# Patient Record
Sex: FEMALE | Race: WHITE | NOT HISPANIC OR LATINO | Employment: OTHER | URBAN - METROPOLITAN AREA
[De-identification: names, ages, dates, MRNs, and addresses within clinical notes are randomized per-mention and may not be internally consistent; named-entity substitution may affect disease eponyms.]

---

## 2017-01-03 ENCOUNTER — HOSPITAL ENCOUNTER (OUTPATIENT)
Dept: MAMMOGRAPHY | Facility: HOSPITAL | Age: 62
Discharge: HOME | End: 2017-01-03
Attending: FAMILY MEDICINE

## 2017-01-03 DIAGNOSIS — Z12.31 VISIT FOR SCREENING MAMMOGRAM: ICD-10-CM

## 2017-01-09 ENCOUNTER — OFFICE VISIT - HEALTHEAST (OUTPATIENT)
Dept: FAMILY MEDICINE | Facility: CLINIC | Age: 62
End: 2017-01-09

## 2017-01-09 DIAGNOSIS — Z00.00 ROUTINE GENERAL MEDICAL EXAMINATION AT A HEALTH CARE FACILITY: ICD-10-CM

## 2017-01-09 LAB
CHOLEST SERPL-MCNC: 215 MG/DL
FASTING STATUS PATIENT QL REPORTED: YES
HDLC SERPL-MCNC: 54 MG/DL
LDLC SERPL CALC-MCNC: 132 MG/DL
TRIGL SERPL-MCNC: 143 MG/DL

## 2017-01-09 ASSESSMENT — MIFFLIN-ST. JEOR: SCORE: 1253.49

## 2017-03-13 ENCOUNTER — COMMUNICATION - HEALTHEAST (OUTPATIENT)
Dept: SCHEDULING | Facility: CLINIC | Age: 62
End: 2017-03-13

## 2017-03-27 ENCOUNTER — COMMUNICATION - HEALTHEAST (OUTPATIENT)
Dept: FAMILY MEDICINE | Facility: CLINIC | Age: 62
End: 2017-03-27

## 2017-03-27 ENCOUNTER — RECORDS - HEALTHEAST (OUTPATIENT)
Dept: GENERAL RADIOLOGY | Facility: CLINIC | Age: 62
End: 2017-03-27

## 2017-03-27 ENCOUNTER — OFFICE VISIT - HEALTHEAST (OUTPATIENT)
Dept: FAMILY MEDICINE | Facility: CLINIC | Age: 62
End: 2017-03-27

## 2017-03-27 DIAGNOSIS — M25.519 SHOULDER PAIN: ICD-10-CM

## 2017-03-27 DIAGNOSIS — M25.519 PAIN IN UNSPECIFIED SHOULDER: ICD-10-CM

## 2017-03-27 ASSESSMENT — MIFFLIN-ST. JEOR: SCORE: 1244.42

## 2017-05-09 ENCOUNTER — COMMUNICATION - HEALTHEAST (OUTPATIENT)
Dept: FAMILY MEDICINE | Facility: CLINIC | Age: 62
End: 2017-05-09

## 2017-05-09 DIAGNOSIS — M25.519 SHOULDER PAIN: ICD-10-CM

## 2017-05-12 ENCOUNTER — RECORDS - HEALTHEAST (OUTPATIENT)
Dept: ADMINISTRATIVE | Facility: OTHER | Age: 62
End: 2017-05-12

## 2017-06-14 ENCOUNTER — RECORDS - HEALTHEAST (OUTPATIENT)
Dept: ADMINISTRATIVE | Facility: OTHER | Age: 62
End: 2017-06-14

## 2017-06-19 ENCOUNTER — RECORDS - HEALTHEAST (OUTPATIENT)
Dept: ADMINISTRATIVE | Facility: OTHER | Age: 62
End: 2017-06-19

## 2017-06-23 ENCOUNTER — RECORDS - HEALTHEAST (OUTPATIENT)
Dept: ADMINISTRATIVE | Facility: OTHER | Age: 62
End: 2017-06-23

## 2017-06-26 ENCOUNTER — COMMUNICATION - HEALTHEAST (OUTPATIENT)
Dept: FAMILY MEDICINE | Facility: CLINIC | Age: 62
End: 2017-06-26

## 2017-06-26 DIAGNOSIS — E04.1 THYROID NODULE: ICD-10-CM

## 2017-06-27 ENCOUNTER — HOSPITAL ENCOUNTER (OUTPATIENT)
Dept: ULTRASOUND IMAGING | Facility: HOSPITAL | Age: 62
Discharge: HOME | End: 2017-06-27
Attending: FAMILY MEDICINE

## 2017-06-27 ENCOUNTER — COMMUNICATION - HEALTHEAST (OUTPATIENT)
Dept: FAMILY MEDICINE | Facility: CLINIC | Age: 62
End: 2017-06-27

## 2017-06-27 DIAGNOSIS — E04.1 THYROID NODULE: ICD-10-CM

## 2017-06-28 ENCOUNTER — OFFICE VISIT - HEALTHEAST (OUTPATIENT)
Dept: FAMILY MEDICINE | Facility: CLINIC | Age: 62
End: 2017-06-28

## 2017-06-28 DIAGNOSIS — E04.1 THYROID NODULE: ICD-10-CM

## 2017-06-28 ASSESSMENT — MIFFLIN-ST. JEOR: SCORE: 1250.94

## 2017-06-29 ENCOUNTER — HOSPITAL ENCOUNTER (OUTPATIENT)
Dept: ULTRASOUND IMAGING | Facility: CLINIC | Age: 62
Discharge: HOME | End: 2017-06-29
Attending: FAMILY MEDICINE

## 2017-06-29 DIAGNOSIS — E04.1 THYROID NODULE: ICD-10-CM

## 2017-07-03 ENCOUNTER — COMMUNICATION - HEALTHEAST (OUTPATIENT)
Dept: FAMILY MEDICINE | Facility: CLINIC | Age: 62
End: 2017-07-03

## 2017-07-03 DIAGNOSIS — E04.1 THYROID NODULE: ICD-10-CM

## 2017-07-06 ENCOUNTER — RECORDS - HEALTHEAST (OUTPATIENT)
Dept: ADMINISTRATIVE | Facility: OTHER | Age: 62
End: 2017-07-06

## 2017-07-07 ENCOUNTER — COMMUNICATION - HEALTHEAST (OUTPATIENT)
Dept: FAMILY MEDICINE | Facility: CLINIC | Age: 62
End: 2017-07-07

## 2017-07-11 ENCOUNTER — COMMUNICATION - HEALTHEAST (OUTPATIENT)
Dept: FAMILY MEDICINE | Facility: CLINIC | Age: 62
End: 2017-07-11

## 2017-07-19 LAB
LAB AP CHARGES (HE HISTORICAL CONVERSION): ABNORMAL
LAB AP INITIAL CYTO EVAL (HE HISTORICAL CONVERSION): ABNORMAL
LAB MED GENERAL PATH INTERP (HE HISTORICAL CONVERSION): ABNORMAL
PATH REPORT.ADDENDUM SPEC: ABNORMAL
PATH REPORT.COMMENTS IMP SPEC: ABNORMAL
PATH REPORT.COMMENTS IMP SPEC: ABNORMAL
PATH REPORT.FINAL DX SPEC: ABNORMAL
PATH REPORT.MICROSCOPIC SPEC OTHER STN: ABNORMAL
PATH REPORT.RELEVANT HX SPEC: ABNORMAL
SPECIMEN DESCRIPTION: ABNORMAL

## 2017-07-25 ENCOUNTER — OFFICE VISIT - HEALTHEAST (OUTPATIENT)
Dept: SURGERY | Facility: CLINIC | Age: 62
End: 2017-07-25

## 2017-07-25 DIAGNOSIS — E04.1 THYROID NODULE: ICD-10-CM

## 2017-07-25 ASSESSMENT — MIFFLIN-ST. JEOR: SCORE: 1260.3

## 2017-07-27 ENCOUNTER — AMBULATORY - HEALTHEAST (OUTPATIENT)
Dept: SURGERY | Facility: CLINIC | Age: 62
End: 2017-07-27

## 2017-07-28 ENCOUNTER — RECORDS - HEALTHEAST (OUTPATIENT)
Dept: ADMINISTRATIVE | Facility: OTHER | Age: 62
End: 2017-07-28

## 2017-08-02 ENCOUNTER — OFFICE VISIT - HEALTHEAST (OUTPATIENT)
Dept: FAMILY MEDICINE | Facility: CLINIC | Age: 62
End: 2017-08-02

## 2017-08-02 DIAGNOSIS — Z01.818 PREOPERATIVE EXAMINATION: ICD-10-CM

## 2017-08-02 LAB
ATRIAL RATE - MUSE: 63 BPM
DIASTOLIC BLOOD PRESSURE - MUSE: NORMAL MMHG
INTERPRETATION ECG - MUSE: NORMAL
P AXIS - MUSE: 17 DEGREES
PR INTERVAL - MUSE: 158 MS
QRS DURATION - MUSE: 70 MS
QT - MUSE: 402 MS
QTC - MUSE: 411 MS
R AXIS - MUSE: -1 DEGREES
SYSTOLIC BLOOD PRESSURE - MUSE: NORMAL MMHG
T AXIS - MUSE: 0 DEGREES
VENTRICULAR RATE- MUSE: 63 BPM

## 2017-08-02 ASSESSMENT — MIFFLIN-ST. JEOR: SCORE: 1258.94

## 2017-08-14 ENCOUNTER — AMBULATORY - HEALTHEAST (OUTPATIENT)
Dept: SURGERY | Facility: CLINIC | Age: 62
End: 2017-08-14

## 2017-08-14 ENCOUNTER — COMMUNICATION - HEALTHEAST (OUTPATIENT)
Dept: FAMILY MEDICINE | Facility: CLINIC | Age: 62
End: 2017-08-14

## 2017-08-18 ENCOUNTER — RECORDS - HEALTHEAST (OUTPATIENT)
Dept: ADMINISTRATIVE | Facility: OTHER | Age: 62
End: 2017-08-18

## 2017-10-22 ENCOUNTER — COMMUNICATION - HEALTHEAST (OUTPATIENT)
Dept: FAMILY MEDICINE | Facility: CLINIC | Age: 62
End: 2017-10-22

## 2017-10-26 ENCOUNTER — AMBULATORY - HEALTHEAST (OUTPATIENT)
Dept: NURSING | Facility: CLINIC | Age: 62
End: 2017-10-26

## 2017-11-29 ENCOUNTER — OFFICE VISIT - HEALTHEAST (OUTPATIENT)
Dept: FAMILY MEDICINE | Facility: CLINIC | Age: 62
End: 2017-11-29

## 2017-11-29 DIAGNOSIS — B00.1 RECURRENT COLD SORES: ICD-10-CM

## 2017-11-29 DIAGNOSIS — Z01.818 ENCOUNTER FOR PREOPERATIVE EXAMINATION FOR GENERAL SURGICAL PROCEDURE: ICD-10-CM

## 2017-11-29 DIAGNOSIS — Z01.818 PREOPERATIVE CLEARANCE: ICD-10-CM

## 2017-11-29 ASSESSMENT — MIFFLIN-ST. JEOR: SCORE: 1273.06

## 2017-12-08 ENCOUNTER — HOSPITAL ENCOUNTER (OUTPATIENT)
Dept: ULTRASOUND IMAGING | Facility: HOSPITAL | Age: 62
Discharge: HOME | End: 2017-12-08
Attending: SURGERY

## 2017-12-08 DIAGNOSIS — D38.0: ICD-10-CM

## 2017-12-13 LAB
LAB AP CHARGES (HE HISTORICAL CONVERSION): NORMAL
LAB AP INITIAL CYTO EVAL (HE HISTORICAL CONVERSION): NORMAL
LAB MED GENERAL PATH INTERP (HE HISTORICAL CONVERSION): NORMAL
PATH REPORT.COMMENTS IMP SPEC: NORMAL
PATH REPORT.COMMENTS IMP SPEC: NORMAL
PATH REPORT.FINAL DX SPEC: NORMAL
PATH REPORT.MICROSCOPIC SPEC OTHER STN: NORMAL
PATH REPORT.RELEVANT HX SPEC: NORMAL
SPECIMEN DESCRIPTION: NORMAL

## 2017-12-27 ENCOUNTER — COMMUNICATION - HEALTHEAST (OUTPATIENT)
Dept: FAMILY MEDICINE | Facility: CLINIC | Age: 62
End: 2017-12-27

## 2017-12-29 ENCOUNTER — RECORDS - HEALTHEAST (OUTPATIENT)
Dept: ADMINISTRATIVE | Facility: OTHER | Age: 62
End: 2017-12-29

## 2018-01-12 ENCOUNTER — HOSPITAL ENCOUNTER (OUTPATIENT)
Dept: MAMMOGRAPHY | Facility: HOSPITAL | Age: 63
Discharge: HOME | End: 2018-01-12
Attending: FAMILY MEDICINE

## 2018-01-12 DIAGNOSIS — Z12.31 VISIT FOR SCREENING MAMMOGRAM: ICD-10-CM

## 2018-06-13 ENCOUNTER — AMBULATORY - HEALTHEAST (OUTPATIENT)
Dept: SCHEDULING | Facility: CLINIC | Age: 63
End: 2018-06-13

## 2018-06-13 ENCOUNTER — OFFICE VISIT - HEALTHEAST (OUTPATIENT)
Dept: FAMILY MEDICINE | Facility: CLINIC | Age: 63
End: 2018-06-13

## 2018-06-13 DIAGNOSIS — Z82.62 FAMILY HX OSTEOPOROSIS: ICD-10-CM

## 2018-06-13 DIAGNOSIS — E04.1 THYROID NODULE: ICD-10-CM

## 2018-06-13 DIAGNOSIS — Z00.00 HEALTHCARE MAINTENANCE: ICD-10-CM

## 2018-06-13 DIAGNOSIS — M19.019 SHOULDER ARTHRITIS: ICD-10-CM

## 2018-06-13 LAB
CHOLEST SERPL-MCNC: 203 MG/DL
FASTING STATUS PATIENT QL REPORTED: YES
FASTING STATUS PATIENT QL REPORTED: YES
GLUCOSE BLD-MCNC: 95 MG/DL (ref 70–99)
HDLC SERPL-MCNC: 54 MG/DL
HGB BLD-MCNC: 13.1 G/DL (ref 12–16)
LDLC SERPL CALC-MCNC: 125 MG/DL
TRIGL SERPL-MCNC: 119 MG/DL
TSH SERPL DL<=0.005 MIU/L-ACNC: 1.33 UIU/ML (ref 0.3–5)

## 2018-06-13 ASSESSMENT — MIFFLIN-ST. JEOR: SCORE: 1258.02

## 2018-06-15 ENCOUNTER — HOSPITAL ENCOUNTER (OUTPATIENT)
Dept: ULTRASOUND IMAGING | Facility: HOSPITAL | Age: 63
Discharge: HOME | End: 2018-06-15
Attending: FAMILY MEDICINE

## 2018-06-15 DIAGNOSIS — E04.1 THYROID NODULE: ICD-10-CM

## 2018-06-22 ENCOUNTER — RECORDS - HEALTHEAST (OUTPATIENT)
Dept: ADMINISTRATIVE | Facility: OTHER | Age: 63
End: 2018-06-22

## 2018-07-13 ENCOUNTER — RECORDS - HEALTHEAST (OUTPATIENT)
Dept: ADMINISTRATIVE | Facility: OTHER | Age: 63
End: 2018-07-13

## 2018-09-14 ENCOUNTER — RECORDS - HEALTHEAST (OUTPATIENT)
Dept: ADMINISTRATIVE | Facility: OTHER | Age: 63
End: 2018-09-14

## 2018-10-24 ENCOUNTER — AMBULATORY - HEALTHEAST (OUTPATIENT)
Dept: NURSING | Facility: CLINIC | Age: 63
End: 2018-10-24

## 2018-10-26 ENCOUNTER — RECORDS - HEALTHEAST (OUTPATIENT)
Dept: ADMINISTRATIVE | Facility: OTHER | Age: 63
End: 2018-10-26

## 2018-11-30 ENCOUNTER — RECORDS - HEALTHEAST (OUTPATIENT)
Dept: ADMINISTRATIVE | Facility: OTHER | Age: 63
End: 2018-11-30

## 2018-12-07 ENCOUNTER — RECORDS - HEALTHEAST (OUTPATIENT)
Dept: ADMINISTRATIVE | Facility: OTHER | Age: 63
End: 2018-12-07

## 2019-01-03 ENCOUNTER — COMMUNICATION - HEALTHEAST (OUTPATIENT)
Dept: FAMILY MEDICINE | Facility: CLINIC | Age: 64
End: 2019-01-03

## 2019-01-04 ENCOUNTER — AMBULATORY - HEALTHEAST (OUTPATIENT)
Dept: NURSING | Facility: CLINIC | Age: 64
End: 2019-01-04

## 2019-02-12 ENCOUNTER — COMMUNICATION - HEALTHEAST (OUTPATIENT)
Dept: FAMILY MEDICINE | Facility: CLINIC | Age: 64
End: 2019-02-12

## 2019-02-12 DIAGNOSIS — B00.1 RECURRENT COLD SORES: ICD-10-CM

## 2019-03-27 ENCOUNTER — OFFICE VISIT - HEALTHEAST (OUTPATIENT)
Dept: FAMILY MEDICINE | Facility: CLINIC | Age: 64
End: 2019-03-27

## 2019-03-27 DIAGNOSIS — R05.9 COUGH: ICD-10-CM

## 2019-03-27 LAB
ALBUMIN SERPL-MCNC: 4 G/DL (ref 3.5–5)
ALP SERPL-CCNC: 74 U/L (ref 45–120)
ALT SERPL W P-5'-P-CCNC: 15 U/L (ref 0–45)
ANION GAP SERPL CALCULATED.3IONS-SCNC: 8 MMOL/L (ref 5–18)
AST SERPL W P-5'-P-CCNC: 20 U/L (ref 0–40)
BASOPHILS # BLD AUTO: 0.1 THOU/UL (ref 0–0.2)
BASOPHILS NFR BLD AUTO: 1 % (ref 0–2)
BILIRUB SERPL-MCNC: 0.3 MG/DL (ref 0–1)
BUN SERPL-MCNC: 13 MG/DL (ref 8–22)
CALCIUM SERPL-MCNC: 9.6 MG/DL (ref 8.5–10.5)
CHLORIDE BLD-SCNC: 105 MMOL/L (ref 98–107)
CO2 SERPL-SCNC: 26 MMOL/L (ref 22–31)
CREAT SERPL-MCNC: 0.87 MG/DL (ref 0.6–1.1)
EOSINOPHIL # BLD AUTO: 0.2 THOU/UL (ref 0–0.4)
EOSINOPHIL NFR BLD AUTO: 2 % (ref 0–6)
ERYTHROCYTE [DISTWIDTH] IN BLOOD BY AUTOMATED COUNT: 12.5 % (ref 11–14.5)
GFR SERPL CREATININE-BSD FRML MDRD: >60 ML/MIN/1.73M2
GLUCOSE BLD-MCNC: 89 MG/DL (ref 70–125)
HCT VFR BLD AUTO: 40 % (ref 35–47)
HGB BLD-MCNC: 13.4 G/DL (ref 12–16)
LYMPHOCYTES # BLD AUTO: 2.7 THOU/UL (ref 0.8–4.4)
LYMPHOCYTES NFR BLD AUTO: 40 % (ref 20–40)
MCH RBC QN AUTO: 30 PG (ref 27–34)
MCHC RBC AUTO-ENTMCNC: 33.5 G/DL (ref 32–36)
MCV RBC AUTO: 90 FL (ref 80–100)
MONOCYTES # BLD AUTO: 0.4 THOU/UL (ref 0–0.9)
MONOCYTES NFR BLD AUTO: 7 % (ref 2–10)
NEUTROPHILS # BLD AUTO: 3.3 THOU/UL (ref 2–7.7)
NEUTROPHILS NFR BLD AUTO: 50 % (ref 50–70)
PLATELET # BLD AUTO: 219 THOU/UL (ref 140–440)
PMV BLD AUTO: 7.9 FL (ref 7–10)
POTASSIUM BLD-SCNC: 4.6 MMOL/L (ref 3.5–5)
PROT SERPL-MCNC: 7.1 G/DL (ref 6–8)
RBC # BLD AUTO: 4.47 MILL/UL (ref 3.8–5.4)
SODIUM SERPL-SCNC: 139 MMOL/L (ref 136–145)
WBC: 6.6 THOU/UL (ref 4–11)

## 2019-08-20 ENCOUNTER — COMMUNICATION - HEALTHEAST (OUTPATIENT)
Dept: FAMILY MEDICINE | Facility: CLINIC | Age: 64
End: 2019-08-20

## 2019-10-01 ENCOUNTER — OFFICE VISIT - HEALTHEAST (OUTPATIENT)
Dept: FAMILY MEDICINE | Facility: CLINIC | Age: 64
End: 2019-10-01

## 2019-10-01 DIAGNOSIS — Z12.31 VISIT FOR SCREENING MAMMOGRAM: ICD-10-CM

## 2019-10-01 DIAGNOSIS — Z13.220 SCREENING FOR LIPOID DISORDERS: ICD-10-CM

## 2019-10-01 DIAGNOSIS — Z13.1 SCREENING FOR DIABETES MELLITUS: ICD-10-CM

## 2019-10-01 DIAGNOSIS — Z00.00 ROUTINE GENERAL MEDICAL EXAMINATION AT A HEALTH CARE FACILITY: ICD-10-CM

## 2019-10-01 DIAGNOSIS — E04.1 THYROID NODULE: ICD-10-CM

## 2019-10-01 LAB
CHOLEST SERPL-MCNC: 199 MG/DL
FASTING STATUS PATIENT QL REPORTED: YES
FASTING STATUS PATIENT QL REPORTED: YES
GLUCOSE BLD-MCNC: 85 MG/DL (ref 70–99)
HDLC SERPL-MCNC: 55 MG/DL
LDLC SERPL CALC-MCNC: 113 MG/DL
TRIGL SERPL-MCNC: 154 MG/DL
TSH SERPL DL<=0.005 MIU/L-ACNC: 0.76 UIU/ML (ref 0.3–5)

## 2019-10-01 ASSESSMENT — MIFFLIN-ST. JEOR: SCORE: 1255.19

## 2019-10-02 LAB
HPV SOURCE: NORMAL
HUMAN PAPILLOMA VIRUS 16 DNA: NEGATIVE
HUMAN PAPILLOMA VIRUS 18 DNA: NEGATIVE
HUMAN PAPILLOMA VIRUS FINAL DIAGNOSIS: NORMAL
HUMAN PAPILLOMA VIRUS OTHER HR: NEGATIVE
SPECIMEN DESCRIPTION: NORMAL

## 2019-10-08 LAB
BKR LAB AP ABNORMAL BLEEDING: NO
BKR LAB AP BIRTH CONTROL/HORMONES: NORMAL
BKR LAB AP CERVICAL APPEARANCE: NORMAL
BKR LAB AP GYN ADEQUACY: NORMAL
BKR LAB AP GYN INTERPRETATION: NORMAL
BKR LAB AP HPV REFLEX: NORMAL
BKR LAB AP LMP: 2009
BKR LAB AP PATIENT STATUS: NORMAL
BKR LAB AP PREVIOUS ABNORMAL: NORMAL
BKR LAB AP PREVIOUS NORMAL: 2014
HIGH RISK?: NO
PATH REPORT.COMMENTS IMP SPEC: NORMAL
RESULT FLAG (HE HISTORICAL CONVERSION): NORMAL

## 2019-12-02 ENCOUNTER — HOSPITAL ENCOUNTER (OUTPATIENT)
Dept: ULTRASOUND IMAGING | Facility: HOSPITAL | Age: 64
Discharge: HOME | End: 2019-12-02
Attending: FAMILY MEDICINE

## 2019-12-02 DIAGNOSIS — E04.1 THYROID NODULE: ICD-10-CM

## 2020-02-06 ENCOUNTER — COMMUNICATION - HEALTHEAST (OUTPATIENT)
Dept: FAMILY MEDICINE | Facility: CLINIC | Age: 65
End: 2020-02-06

## 2020-02-06 DIAGNOSIS — Z12.11 SPECIAL SCREENING FOR MALIGNANT NEOPLASMS, COLON: ICD-10-CM

## 2020-02-06 DIAGNOSIS — Z12.31 VISIT FOR SCREENING MAMMOGRAM: ICD-10-CM

## 2020-02-14 ENCOUNTER — HOSPITAL ENCOUNTER (OUTPATIENT)
Dept: MAMMOGRAPHY | Facility: CLINIC | Age: 65
Discharge: HOME | End: 2020-02-14
Attending: FAMILY MEDICINE

## 2020-02-14 DIAGNOSIS — Z12.31 VISIT FOR SCREENING MAMMOGRAM: ICD-10-CM

## 2020-02-18 ENCOUNTER — COMMUNICATION - HEALTHEAST (OUTPATIENT)
Dept: SCHEDULING | Facility: CLINIC | Age: 65
End: 2020-02-18

## 2020-02-18 ENCOUNTER — OFFICE VISIT - HEALTHEAST (OUTPATIENT)
Dept: FAMILY MEDICINE | Facility: CLINIC | Age: 65
End: 2020-02-18

## 2020-02-18 DIAGNOSIS — J06.9 VIRAL URI: ICD-10-CM

## 2020-02-18 LAB — DEPRECATED S PYO AG THROAT QL EIA: NORMAL

## 2020-02-18 ASSESSMENT — MIFFLIN-ST. JEOR: SCORE: 1252.92

## 2020-02-19 LAB — GROUP A STREP BY PCR: NORMAL

## 2020-05-15 ENCOUNTER — COMMUNICATION - HEALTHEAST (OUTPATIENT)
Dept: FAMILY MEDICINE | Facility: CLINIC | Age: 65
End: 2020-05-15

## 2020-05-15 DIAGNOSIS — B00.1 RECURRENT COLD SORES: ICD-10-CM

## 2020-05-16 ENCOUNTER — COMMUNICATION - HEALTHEAST (OUTPATIENT)
Dept: FAMILY MEDICINE | Facility: CLINIC | Age: 65
End: 2020-05-16

## 2020-05-16 DIAGNOSIS — B00.1 RECURRENT COLD SORES: ICD-10-CM

## 2020-05-18 RX ORDER — VALACYCLOVIR HYDROCHLORIDE 1 G/1
TABLET, FILM COATED ORAL
Qty: 20 TABLET | Refills: 11 | Status: SHIPPED | OUTPATIENT
Start: 2020-05-18 | End: 2021-08-06

## 2020-06-01 ENCOUNTER — RECORDS - HEALTHEAST (OUTPATIENT)
Dept: ADMINISTRATIVE | Facility: OTHER | Age: 65
End: 2020-06-01

## 2020-07-07 ENCOUNTER — RECORDS - HEALTHEAST (OUTPATIENT)
Dept: ADMINISTRATIVE | Facility: OTHER | Age: 65
End: 2020-07-07

## 2020-10-05 ENCOUNTER — COMMUNICATION - HEALTHEAST (OUTPATIENT)
Dept: SCHEDULING | Facility: CLINIC | Age: 65
End: 2020-10-05

## 2020-10-05 ENCOUNTER — OFFICE VISIT - HEALTHEAST (OUTPATIENT)
Dept: FAMILY MEDICINE | Facility: CLINIC | Age: 65
End: 2020-10-05

## 2020-10-05 DIAGNOSIS — R05.3 CHRONIC COUGH: ICD-10-CM

## 2020-11-04 ENCOUNTER — AMBULATORY - HEALTHEAST (OUTPATIENT)
Dept: SCHEDULING | Facility: CLINIC | Age: 65
End: 2020-11-04

## 2020-11-04 ENCOUNTER — HOSPITAL ENCOUNTER (OUTPATIENT)
Dept: ULTRASOUND IMAGING | Facility: HOSPITAL | Age: 65
Discharge: HOME | End: 2020-11-04
Attending: FAMILY MEDICINE

## 2020-11-04 ENCOUNTER — OFFICE VISIT - HEALTHEAST (OUTPATIENT)
Dept: FAMILY MEDICINE | Facility: CLINIC | Age: 65
End: 2020-11-04

## 2020-11-04 DIAGNOSIS — R93.89 ABNORMAL ULTRASOUND OF THYROID GLAND: ICD-10-CM

## 2020-11-04 DIAGNOSIS — Z13.220 ENCOUNTER FOR SCREENING FOR LIPOID DISORDERS: ICD-10-CM

## 2020-11-04 DIAGNOSIS — Z78.0 POSTMENOPAUSAL: ICD-10-CM

## 2020-11-04 DIAGNOSIS — Z13.1 ENCOUNTER FOR SCREENING FOR DIABETES MELLITUS: ICD-10-CM

## 2020-11-04 DIAGNOSIS — Z00.00 ROUTINE GENERAL MEDICAL EXAMINATION AT A HEALTH CARE FACILITY: ICD-10-CM

## 2020-11-04 DIAGNOSIS — Z23 IMMUNIZATION DUE: ICD-10-CM

## 2020-11-04 DIAGNOSIS — Z23 INFLUENZA VACCINE NEEDED: ICD-10-CM

## 2020-11-04 LAB
CHOLEST SERPL-MCNC: 196 MG/DL
FASTING STATUS PATIENT QL REPORTED: YES
FASTING STATUS PATIENT QL REPORTED: YES
GLUCOSE BLD-MCNC: 98 MG/DL (ref 70–99)
HDLC SERPL-MCNC: 54 MG/DL
LDLC SERPL CALC-MCNC: 121 MG/DL
TRIGL SERPL-MCNC: 104 MG/DL

## 2020-11-04 ASSESSMENT — MIFFLIN-ST. JEOR: SCORE: 1266.06

## 2020-11-06 ENCOUNTER — COMMUNICATION - HEALTHEAST (OUTPATIENT)
Dept: FAMILY MEDICINE | Facility: CLINIC | Age: 65
End: 2020-11-06

## 2020-11-06 DIAGNOSIS — J01.10 ACUTE NON-RECURRENT FRONTAL SINUSITIS: ICD-10-CM

## 2021-01-13 ENCOUNTER — RECORDS - HEALTHEAST (OUTPATIENT)
Dept: ADMINISTRATIVE | Facility: OTHER | Age: 66
End: 2021-01-13

## 2021-04-19 ENCOUNTER — HOSPITAL ENCOUNTER (OUTPATIENT)
Dept: MAMMOGRAPHY | Facility: CLINIC | Age: 66
Discharge: HOME | End: 2021-04-19
Attending: FAMILY MEDICINE

## 2021-04-19 DIAGNOSIS — Z12.31 VISIT FOR SCREENING MAMMOGRAM: ICD-10-CM

## 2021-05-18 ENCOUNTER — AMBULATORY - HEALTHEAST (OUTPATIENT)
Dept: CARDIOLOGY | Facility: CLINIC | Age: 66
End: 2021-05-18

## 2021-05-18 DIAGNOSIS — Z00.6 EXAMINATION OF PARTICIPANT OR CONTROL IN CLINICAL RESEARCH: ICD-10-CM

## 2021-05-18 ASSESSMENT — MIFFLIN-ST. JEOR: SCORE: 1286.15

## 2021-05-23 ENCOUNTER — HEALTH MAINTENANCE LETTER (OUTPATIENT)
Age: 66
End: 2021-05-23

## 2021-05-25 ENCOUNTER — RECORDS - HEALTHEAST (OUTPATIENT)
Dept: ADMINISTRATIVE | Facility: CLINIC | Age: 66
End: 2021-05-25

## 2021-05-27 VITALS
HEIGHT: 62 IN | TEMPERATURE: 98.2 F | HEART RATE: 69 BPM | SYSTOLIC BLOOD PRESSURE: 117 MMHG | OXYGEN SATURATION: 95 % | WEIGHT: 173.7 LBS | RESPIRATION RATE: 13 BRPM | BODY MASS INDEX: 31.96 KG/M2 | DIASTOLIC BLOOD PRESSURE: 64 MMHG

## 2021-05-27 NOTE — PROGRESS NOTES
Chief Complaint   Patient presents with     Cough     x 3-4 months - no other symptoms         HPI:   Yana Valdez is a 63 y.o. female cough for several months.  Gets better and gets worse.  Was productive for a time.  No fever.  No shortness of breath.  No chest pain.  No head congestion--last time 2-3 weeks ago.  No medication taken.  No trouble with heartburn.  Does .  No night sweats.  No hemoptysis.    No history of tobacco.  No h/o asthma.  No h/o allergies.    ROS:  A 10 point comprehensive review of systems was negative except as noted.     Medications:  Current Outpatient Medications on File Prior to Visit   Medication Sig Dispense Refill     valACYclovir (VALTREX) 1000 MG tablet TAKE 2 TABLETS BY MOUTH AT THE ONSET OF THE COLD SORE AND 1 TABLET 4 HOURS LATER. CAN REPEAT IF NEEDED FOR 3 DAYS 20 tablet 0     No current facility-administered medications on file prior to visit.          Social History:  Social History     Tobacco Use     Smoking status: Never Smoker     Smokeless tobacco: Never Used   Substance Use Topics     Alcohol use: Yes     Comment: occasionally         Physical Exam:   Vitals:    03/27/19 1300   BP: 124/70   Pulse: 60   Temp: 98.7  F (37.1  C)   TempSrc: Oral   SpO2: 98%   Weight: 173 lb (78.5 kg)       GENERAL:   Alert. Oriented.  EYES: Clear  HENT:  Ears: R TM pearly gray. Normal landmarks. L TM pearly gray.  Normal landmarks  Nose: Clear.  Sinuses: Nontender.  Oropharynx:  No erythema. No exudate.  NECK: Supple. No adenopathy.  LUNGS: Clear to ascultation.  No crackles.  No wheezing  HEART: RRR  SKIN:  No rash.     LABS:  Results for orders placed or performed in visit on 03/27/19   HM1 (CBC with Diff)   Result Value Ref Range    WBC 6.6 4.0 - 11.0 thou/uL    RBC 4.47 3.80 - 5.40 mill/uL    Hemoglobin 13.4 12.0 - 16.0 g/dL    Hematocrit 40.0 35.0 - 47.0 %    MCV 90 80 - 100 fL    MCH 30.0 27.0 - 34.0 pg    MCHC 33.5 32.0 - 36.0 g/dL    RDW 12.5 11.0 - 14.5 %     Platelets 219 140 - 440 thou/uL    MPV 7.9 7.0 - 10.0 fL    Neutrophils % 50 50 - 70 %    Lymphocytes % 40 20 - 40 %    Monocytes % 7 2 - 10 %    Eosinophils % 2 0 - 6 %    Basophils % 1 0 - 2 %    Neutrophils Absolute 3.3 2.0 - 7.7 thou/uL    Lymphocytes Absolute 2.7 0.8 - 4.4 thou/uL    Monocytes Absolute 0.4 0.0 - 0.9 thou/uL    Eosinophils Absolute 0.2 0.0 - 0.4 thou/uL    Basophils Absolute 0.1 0.0 - 0.2 thou/uL      XRAY:  CXR PA & LAT:   No cardiomegaly, infiltrate or effusion.  Normal chest. Personally reviewed film.      Assessment/Plan:    1. Cough  HM1(CBC and Differential)    Comprehensive Metabolic Panel    XR Chest 2 Views    HM1 (CBC with Diff)    doxycycline (MONODOX) 100 MG capsule    fluticasone propionate (FLONASE) 50 mcg/actuation nasal spray      Prolonged cough.  Discussed may be series of URI, post nasal drainage, less likely Reflux.  Course of doxy.  Start steroid nasal spray.  Recheck if worsening or no improvement in 4 weeks.          Stacey Marks MD      3/27/2019    The following portions of the patient's history were reviewed and updated as appropriate: allergies, current medications, past family history, past medical history, past social history, past surgical history and problem list.

## 2021-05-30 VITALS — HEIGHT: 61 IN | WEIGHT: 168 LBS | BODY MASS INDEX: 31.72 KG/M2

## 2021-05-30 VITALS — BODY MASS INDEX: 32.1 KG/M2 | WEIGHT: 170 LBS | HEIGHT: 61 IN

## 2021-05-31 ENCOUNTER — RECORDS - HEALTHEAST (OUTPATIENT)
Dept: ADMINISTRATIVE | Facility: CLINIC | Age: 66
End: 2021-05-31

## 2021-05-31 VITALS — WEIGHT: 169.44 LBS | HEIGHT: 61 IN | BODY MASS INDEX: 31.99 KG/M2

## 2021-05-31 VITALS — BODY MASS INDEX: 32.32 KG/M2 | HEIGHT: 61 IN | WEIGHT: 171.2 LBS

## 2021-05-31 VITALS — WEIGHT: 174.31 LBS | BODY MASS INDEX: 32.91 KG/M2 | HEIGHT: 61 IN

## 2021-05-31 VITALS — WEIGHT: 171.5 LBS | BODY MASS INDEX: 32.38 KG/M2 | HEIGHT: 61 IN

## 2021-05-31 NOTE — TELEPHONE ENCOUNTER
Name of form/paperwork: Other:  [facility] Physician Report  Have you been seen for this request: Yes:  3/27/19 Last physical was 6/13/18  Do we have the form: Yes- Sent via My Chart  When is form needed by: 8/30/19  How would you like the form returned:   Fax Number: N/A  Patient Notified form requests are processed in 3-5 business days: Yes  (If patient needs form sooner, please note that in this message.)  Okay to leave a detailed message? Yes, [phone]

## 2021-05-31 NOTE — TELEPHONE ENCOUNTER
Spoke to pt's , relayed that no forms were received via [employer].  , Ja, states he will be faxing forms to [phone].  Will wait for fax.

## 2021-05-31 NOTE — TELEPHONE ENCOUNTER
Spoke to pt's , relayed that no forms were received via [employer].  , Ja, states he will be faxing forms to [phone].  Will wait for fax.

## 2021-05-31 NOTE — TELEPHONE ENCOUNTER
Who is calling:  Patient's  Ja  Reason for Call:  Patient needs a form filled out for Day Care re-certification. Reviewed patient's medications and Ja relayed patient is not taking either anymore. Valacyclovir is only for cold sore outbreaks but not listed as PRN. Patient is not taking Flonase  Date of last appointment with primary care: 3/27/19  Okay to leave a detailed message: Yes

## 2021-05-31 NOTE — TELEPHONE ENCOUNTER
"Pt's  notified forms are complete.  Copy emailed and also sent via mail to home address on file.  Message sent to pt to schedule a physical and is due for tdap in October.      Copy in the \"jic\" and sent to scan for chart.  "

## 2021-06-01 VITALS — WEIGHT: 169.25 LBS | HEIGHT: 62 IN | BODY MASS INDEX: 31.15 KG/M2
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
## 2021-06-02 VITALS — WEIGHT: 173 LBS | BODY MASS INDEX: 32.16 KG/M2

## 2021-06-03 VITALS
TEMPERATURE: 97.7 F | BODY MASS INDEX: 32 KG/M2 | HEIGHT: 61 IN | DIASTOLIC BLOOD PRESSURE: 78 MMHG | SYSTOLIC BLOOD PRESSURE: 112 MMHG | WEIGHT: 169.5 LBS | HEART RATE: 68 BPM | RESPIRATION RATE: 12 BRPM

## 2021-06-04 ENCOUNTER — RECORDS - HEALTHEAST (OUTPATIENT)
Dept: ADMINISTRATIVE | Facility: OTHER | Age: 66
End: 2021-06-04

## 2021-06-04 VITALS
HEIGHT: 61 IN | BODY MASS INDEX: 31.91 KG/M2 | TEMPERATURE: 98.6 F | DIASTOLIC BLOOD PRESSURE: 82 MMHG | SYSTOLIC BLOOD PRESSURE: 133 MMHG | OXYGEN SATURATION: 100 % | WEIGHT: 169 LBS | HEART RATE: 65 BPM | RESPIRATION RATE: 16 BRPM

## 2021-06-05 VITALS
HEART RATE: 54 BPM | BODY MASS INDEX: 32.47 KG/M2 | DIASTOLIC BLOOD PRESSURE: 83 MMHG | HEIGHT: 61 IN | TEMPERATURE: 98.3 F | SYSTOLIC BLOOD PRESSURE: 136 MMHG | RESPIRATION RATE: 12 BRPM | WEIGHT: 172 LBS

## 2021-06-05 NOTE — TELEPHONE ENCOUNTER
Please note this is two requests. Patient states the referral need to be done for payment .    #1  Referral Request  Type of referral: colonsocopy  Who s requesting: Patient  Why the request: due she states per provider   Have you been seen for this request: Yes  Does patient have a preference on a group/provider? [facility]  Okay to leave a detailed message?  Yes [phone]       #2  Referral Request  Type of referral: mammogram  Who s requesting: Patient  Why the request: Mother and sister  of breast cancer.  It is recommended she have a mammogram every year.   Have you been seen for this request: Yes  Does patient have a preference on a group/provider? [facility]   Okay to leave a detailed message?  Yes [phone]

## 2021-06-06 NOTE — TELEPHONE ENCOUNTER
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  Would like a Strep test.  Declines appt ; leaving on a trip.    RN Action/Disposition:  Protocol recommends see Dr in 24 hrs/ lab test within 24 hrs..  Offered [facility]  Call back if worse symptoms  Discussed home care measures.  Agrees to plan.     Ann Denise, BRAXTON    [facility] Triage/med refill  2/18/2020  3:46 PM        Reason for Disposition    [1] Adult is leaving on a trip AND [2] requests an antibiotic NOW    Protocols used: SORE THROAT-A-AH

## 2021-06-06 NOTE — PROGRESS NOTES
Assessment & Plan:       1. Viral URI  Rapid Strep A Screen-Throat    Group A Strep, RNA Direct Detection, Throat     Medical Decision Making  Patient presents with 1 day of sore throat after exposure to strep pharyngitis.  Her rapid strep is negative at this time with no significant signs of strep throat on physical exam.  The patient may be developing early signs of a viral upper respiratory infection.  Recommended conservative management of fluids, rest, over-the-counter analgesics, and honey.  Discussed signs of worsening symptoms and when to follow-up with PCP if no symptom improvement.    Patient Instructions   Rapid Strep test was negative.     If overnight test returns positive, we will call tomorrow and call in antibiotic prescription.    No notification means that overnight test returned negative.    Symptoms are likely due to viral infection that will resolve on its own in 1-2 weeks.    May continue with symptomatic treatments including:  - Salt water gargles  - Warm beverages such as a non-caffeinated tea with honey  - Throat drops  - Ibuprofen or acetaminophen for pain or fever relief    If fevers not coming down with acetaminophen or ibuprofen, shortness of breath, not tolerating oral liquid intake, drooling, or stiff neck, return for re-evaluation immediately. Otherwise, if no improvement in the next week, follow up with your primary care provider.          Subjective:       Yana Valdez is a 64 y.o. female here for evaluation of recent exposure to strep throat.  Associated symptoms include feeling some swelling along the patient's lymph nodes in the neck as well as mild sore throat.  She otherwise denies cough, fevers, shortness of breath, and body aches.  Patient did get a flu shot this year.  She does not take any medicines to help with her symptoms.    The following portions of the patient's history were reviewed and updated as appropriate: allergies, current medications and problem  "list.    Review of Systems  Pertinent items are noted in HPI.     Allergies  No Known Allergies    Family History   Problem Relation Age of Onset     Breast cancer Mother 43     Dementia Mother         lewy body     Hypertension Father      Diabetes Father      Breast cancer Sister 43     Thyroid nodules Sister      Diabetes Brother      Thyroid nodules Brother      Diabetes Brother      Thyroid nodules Sister        Social History     Socioeconomic History     Marital status:      Spouse name: None     Number of children: None     Years of education: None     Highest education level: None   Occupational History     None   Social Needs     Financial resource strain: None     Food insecurity:     Worry: None     Inability: None     Transportation needs:     Medical: None     Non-medical: None   Tobacco Use     Smoking status: Never Smoker     Smokeless tobacco: Never Used   Substance and Sexual Activity     Alcohol use: Yes     Comment: occasionally     Drug use: No     Sexual activity: None   Lifestyle     Physical activity:     Days per week: None     Minutes per session: None     Stress: None   Relationships     Social connections:     Talks on phone: None     Gets together: None     Attends Jehovah's witness service: None     Active member of club or organization: None     Attends meetings of clubs or organizations: None     Relationship status: None     Intimate partner violence:     Fear of current or ex partner: None     Emotionally abused: None     Physically abused: None     Forced sexual activity: None   Other Topics Concern     None   Social History Narrative     None         Objective:       /82 (Patient Site: Right Arm, Patient Position: Sitting, Cuff Size: Adult Regular)   Pulse 65   Temp 98.6  F (37  C) (Oral)   Resp 16   Ht 5' 1.25\" (1.556 m)   Wt 169 lb (76.7 kg)   LMP 12/30/2008   SpO2 100%   BMI 31.67 kg/m    General appearance: alert, appears stated age, cooperative, no distress and " non-toxic  Throat: Mild tonsillar swelling with erythema, no exudate, mucous membranes moist  Neck: mild anterior cervical adenopathy and supple, symmetrical, trachea midline     Lab Results    Recent Results (from the past 24 hour(s))   Rapid Strep A Screen-Throat   Result Value Ref Range    Rapid Strep A Antigen No Group A Strep detected, presumptive negative No Group A Strep detected, presumptive negative     I personally reviewed these results and discussed findings with the patient.

## 2021-06-08 NOTE — PROGRESS NOTES
Assessment/Plan:     Health maintenance female exam.  All questions answered.  PAP UTD  Breast self exam technique reviewed and patient encouraged to perform self-exam monthly.  Discussed healthy lifestyle modifications.  Mammogram UTD.  Await fasting lab results  The following high BMI interventions were performed this visit: encouragement to exercise and weight monitoring    1. Routine general medical examination at a health care facility  - Influenza, Seasonal,Quad Inj, 36+ MOS  - Lipid Cascade FASTING  - Glucose  - Hemoglobin  - Thyroid Cascade          Subjective:      Yana Valdez is a 61 y.o. female who presents for an annual exam.  She is overall doing very well.  She presents for her routine examination.    She really has no questions today.  3 of her siblings have thyroid nodules and 2 of them are on thyroid medication.  She wonders if she can have her thyroid levels checked today.      Healthy Habits:   Regular Exercise: Yes  Sunscreen Use: Yes  Healthy Diet: Yes  Dental Visits Regularly: Yes  Seat Belt: Yes  Sexually active: Yes  Self Breast Exam Monthly:Yes  Colonoscopy: Yes  Prevention of Osteoporosis: Yes  Last Dexa: N/A    Immunization History   Administered Date(s) Administered     Influenza, seasonal,quad inj 36+ mos 2015, 2017     Influenza, seasonal,quad inj 6-35 mos 2014     Td, historic 2000, 2000, 10/12/2009     Tdap 10/12/2009     ZOSTER 2015     Immunization status: up to date and documented.    Gynecologic History  Patient's last menstrual period was 2008.  Contraception: post menopausal status  Last Pap: . Results were: normal  Last mammogram: . Results were: normal      OB History    Para Term  AB SAB TAB Ectopic Multiple Living   2 2 2             # Outcome Date GA Lbr Tonny/2nd Weight Sex Delivery Anes PTL Lv   2 Term            1 Term                   No current outpatient prescriptions on file.     No current  facility-administered medications for this visit.      Past Medical History   Diagnosis Date     Knee osteoarthritis      Past Surgical History   Procedure Laterality Date     Pr knee scope,diagnostic       Description: Arthroscopy Knee Left;  Proc Date: 10/12/1999;  Comments: ACL tear- not repaired; menisicus repaired     Pr lap,cholecystectomy       Description: Cholecystectomy Laparoscopic;  Proc Date: 10/12/1992;     Pr removal gallbladder       Description: Cholecystectomy;  Recorded: 11/06/2014;     Pr repair cruciate ligament,knee       Description: Primary Repair Of Knee Ligament Cruciate Anterior;  Recorded: 11/06/2014;     Hand surgery       Review of patient's allergies indicates no known allergies.  Family History   Problem Relation Age of Onset     Breast cancer Mother 43     Dementia Mother      lewy body     Hypertension Father      Diabetes Father      Breast cancer Sister 43     Thyroid nodules Sister      Diabetes Brother      Thyroid nodules Brother      Thyroid nodules Brother      Social History     Social History     Marital status:      Spouse name: N/A     Number of children: N/A     Years of education: N/A     Occupational History     Not on file.     Social History Main Topics     Smoking status: Never Smoker     Smokeless tobacco: Not on file     Alcohol use Not on file     Drug use: Not on file     Sexual activity: Not on file     Other Topics Concern     Not on file     Social History Narrative       Review of Systems  General:  Denies problems  Eyes:  Denies problems  Ears/Nose/Throat:  Denies problems  Cardiovascular:  Denies problems  Respiratory:  Denies problems  Gastrointestinal:  Denies problems  Genitourinary:  Denies problems  Musculoskeletal:  Denies problems  Skin:  Denies problems, Neurologic:  Denies problems  Psychiatric:  Denies problems  Endocrine:  Denies problems  Heme/Lymphatic:  Denies problems  Allergic/Immunologic:  Denies problems       Objective:        "  Vitals:    01/09/17 0920   BP: 100/78   Pulse: 64   Resp: 12   Temp: 98.3  F (36.8  C)   TempSrc: Oral   Weight: 170 lb (77.1 kg)   Height: 5' 1\" (1.549 m)       Physical Exam:  General Appearance: Alert, cooperative, no distress, appears stated age   Head: Normocephalic, without obvious abnormality, atraumatic  Eyes: PERRL, conjunctiva/corneas clear, EOM's intact   Ears: Normal TM's and external ear canals, both ears  Nose:Nares normal, septum midline,mucosa normal, no drainage    Throat:Lips, mucosa, and tongue normal; teeth and gums normal  Neck: Supple, symmetrical, trachea midline, no adenopathy;  thyroid: not enlarged, symmetric, no tenderness/mass/nodules  Back: Symmetric, no curvature, ROM normal,  Lungs: Clear to auscultation bilaterally, respirations unlabored  Breasts: No breast masses, tenderness, asymmetry, or nipple discharge.  Heart: Regular rate and rhythm, S1 and S2 normal, no murmur, rub, or gallop  Abdomen: Soft, non-tender, bowel sounds active all four quadrants,  no masses, no organomegaly  Extremities: Extremities normal, atraumatic, no cyanosis or edema  Skin: Skin color, texture, turgor normal, no rashes or lesions  Lymph nodes: Cervical, supraclavicular, and axillary nodes normal and   Neurologic: Normal       "

## 2021-06-08 NOTE — TELEPHONE ENCOUNTER
RN cannot approve Refill Request    RN can NOT refill this medication Protocol failed and NO refill given.      Lien Arboleda, [facility] Triage/Med Refill 5/18/2020    Requested Prescriptions   Pending Prescriptions Disp Refills     valACYclovir (VALTREX) 1000 MG tablet 20 tablet 11     Sig: TAKE 2 TABLETS BY MOUTH AT THE ONSET OF THE COLD SORE AND 1 TABLET 4 HOURS LATER. CAN REPEAT IF NEEDED FOR 3 DAYS       Antivirals Refill Protocol Failed - 5/15/2020  4:46 PM        Failed - Renal function done in last year     Creatinine   Date Value Ref Range Status   03/27/2019 0.87 0.60 - 1.10 mg/dL Final             Passed - Visit with PCP or prescribing provider visit in past 12 months or next 3 months     Last office visit with prescriber/PCP: 6/28/2017 Ema Hart MD OR same dept: Visit date not found OR same specialty: 3/27/2019 Stacey Marks MD  Last physical: 10/1/2019 Last MTM visit: Visit date not found   Next visit within 3 mo: Visit date not found  Next physical within 3 mo: Visit date not found  Prescriber OR PCP: Ema Hart MD  Last diagnosis associated with med order: 1. Recurrent cold sores  - valACYclovir (VALTREX) 1000 MG tablet; TAKE 2 TABLETS BY MOUTH AT THE ONSET OF THE COLD SORE AND 1 TABLET 4 HOURS LATER. CAN REPEAT IF NEEDED FOR 3 DAYS  Dispense: 20 tablet; Refill: 0    If protocol passes may refill for 12 months if within 3 months of last provider visit (or a total of 15 months).

## 2021-06-09 NOTE — PROGRESS NOTES
Assessment/Plan:    Yana Valdez is a 61 y.o. female presenting for:    1. Shoulder pain  I have personally reviewed the x-ray of the shoulder provided to be unremarkable for any fracture or dislocation.  It is unclear what is exactly causing the patient's pain.  Potentially she is having some impingement.  I have asked her to pay somewhat closer attention to when the pain occurs and what activities she is doing during that time..  She will keep me updated.  Otherwise I did give her some physical therapy exercises.  If this continues to happen or becomes worse I would have her see the orthopedic physicians.  - XR Shoulder Right 2 or More VWS; Future        There are no discontinued medications.        Chief Complaint:  Chief Complaint   Patient presents with     Neck Pain     R sided neck, shoulder, arm pain     Cough       Subjective:   Yana Valdez is a very pleasant 61-year-old female presenting to the clinic today with concerns for right-sided shoulder pain.  The patient states that over the last several months she has had several episodes of right-sided fairly intense shoulder pain.  She states that she will be going about her daily business when she has an acute onset of pain starting she believes in her right armpit radiating in her shoulder and down her arm.  When this happens her arm is so painful that she has to hold it close to her body.  She is unsure if she has any numbness.  She does not feel like something has moved out of position.  The pain will slowly josey over a few hours.  She cannot think of any factors that exacerbate the issue.  When she is having pain she does have some significant tenderness in her right anterior shoulder.  She states that she cannot sleep on that shoulder due to discomfort.    There was no trauma.  No chest pain or shortness of breath or lightheadedness is associated.    12 point review of systems completed and negative except for what has been described  "above    History   Smoking Status     Never Smoker   Smokeless Tobacco     Not on file       No current outpatient prescriptions on file.         Objective:  Vitals:    03/27/17 1432   BP: 110/72   Pulse: 64   Resp: 12   Temp: 98.3  F (36.8  C)   TempSrc: Oral   Weight: 168 lb (76.2 kg)   Height: 5' 1\" (1.549 m)       Vital signs reviewed and stable  General: No acute distress  Psych: Appropriate affect  HEENT: moist mucous membranes  Cardiovascular: regular rate and rhythm with no murmur  Pulmonary: clear to auscultation bilaterally with no wheeze  Abdomen: soft, non tender, non distended with normo-active bowel sounds  Extremities: warm and well perfused with no edema  Skin: warm and dry with no rash  Musculoskeletal: Right shoulder at this point has overall normal range of motion.  She has slightly decreased range of motion when she is trying to reach behind her.  Minimal tenderness to palpation but she does have some tenderness in the anterior shoulder region.       This note has been dictated and transcribed using voice recognition software.   Any errors in transcription are unintentional and inherent to the software.  "

## 2021-06-11 NOTE — PROGRESS NOTES
Assessment/Plan:    Yana Valdez is a 61 y.o. female presenting for:    1. Thyroid nodule  Because she had 2 highly suspicious thyroid nodules we will order an ultrasound thyroid biopsy.  I discussed this procedure with the patient as well as the risks and benefits.  We discussed the importance of the procedure in determining the consistency of the nodules.  We discussed that her ultrasound results are very concerning for thyroid cancer but that the majority of thyroid cancers are not metastatic but would likely require surgery.  - HM1(CBC and Differential)  - Thyroid Cascade  - Basic Metabolic Panel  - HM1 (CBC with Diff)  - US Thyroid Biopsy; Future    15 minutes spent today discussing this diagnosis with the patient over half of which spent in face-to-face counseling and coordination of care    There are no discontinued medications.        Chief Complaint:  Chief Complaint   Patient presents with     Follow-up     Thyroid U/S Results       Subjective:   Yana Valdez is a pleasant 61-year-old female presenting to the clinic today for follow-up of thyroid nodules.  The patient had an MRI of her shoulder for shoulder pain and incidentally a thyroid nodule was partially visualized.  Follow-up was recommended and it was recommended that she return to her primary for follow-up.  I had ordered an ultrasound and asked her to schedule follow-up appointment to review the results.  The results showed 3 thyroid nodules 2 in the highly suspicious category and one in the moderately suspicious category.    1.  Right thyroid 3.7 cm TI-RADS 5 nodule.  2.  Right thyroid 2.6 cm TI-RADS 4 nodule.  3.  Left thyroid 1.4 cm TI-RADS 5 nodule.    I have asked her to come in for follow-up to discuss these nodules.  The patient has a family history of thyroid disease but she does not feel as though there is any thyroid cancer in the family.    12 point review of systems completed and negative except for what has been described  "above    History   Smoking Status     Never Smoker   Smokeless Tobacco     Not on file       No current outpatient prescriptions on file.         Objective:  Vitals:    06/28/17 1408   BP: 110/80   Pulse: 64   Resp: 12   Temp: 99.2  F (37.3  C)   TempSrc: Oral   Weight: 169 lb 7 oz (76.9 kg)   Height: 5' 1\" (1.549 m)       Vital signs reviewed and stable  General: No acute distress  Psych: Appropriate affect  Further examination was not done due to the nature of this visit today         This note has been dictated and transcribed using voice recognition software.   Any errors in transcription are unintentional and inherent to the software.  "

## 2021-06-12 NOTE — TELEPHONE ENCOUNTER
Medication Request  Medication name: Antibiotics for the cough  [facility]ed [facility]  Reason for request: The patient states she is still coughing and TannerEma MD noted she could try an antibiotic. The patient is not sure what she wants to do as for she doesn't like the side effects. The patient would like a phone call.   When did you use medication last?:  NA  Patient offered appointment:  N/A - electronic request  Okay to leave a detailed message: yes

## 2021-06-12 NOTE — TELEPHONE ENCOUNTER
Left detailed message for patient that Dr. Hart has sent Augmentin to [facility] and has placed ENT referral. ENT phone numbers left for patient to call and schedule.

## 2021-06-12 NOTE — TELEPHONE ENCOUNTER
Patient Returning Call  Reason for call:  Would like to try antibiotic and also referral for ENT clinic  Information relayed to patient:  See previous phone message  Patient has additional questions:  No  If YES, what are your questions/concerns: N/A  Okay to leave a detailed message?: Yes  Would like to be called when prescription has been filled and referral placed.

## 2021-06-12 NOTE — TELEPHONE ENCOUNTER
I will send and antibiotic and will place the referral - can you get her numbers?    [facility]: [phone]  [facility]: [phone]  [place] [facility]: [phone]      Thanks    BB

## 2021-06-12 NOTE — TELEPHONE ENCOUNTER
Called and left message with patient.  Could trial antibiotic, reflux medication or even referral to ENT.  Encouraged her to either call back and let me know which she would like to do or to send a MyChart message.    MASSIEL

## 2021-06-12 NOTE — PROGRESS NOTES
Assessment/Plan:     Problem List Items Addressed This Visit     None      Visit Diagnoses     Chronic cough    -  Primary    Relevant Orders    XR Chest 2 Views (Completed)        The patient has a cough which is been present for about a month.  It sounds more like allergies and I suggested empirically treating with Zyrtec 10 mg daily for the next 2 to 4 weeks.  She is comfortable with that plan will let me know if her symptoms do not resolve.  Consider empirically treating for a sinusitis with an antibiotic as a next step.  Patient's chest x-ray today was negative.    Subjective:       65 y.o. female presents for evaluation of a cough.  The patient's daughter encouraged her to be seen today.  The patient states that her symptoms started about a month ago with a slight dry cough.  She denies any other symptoms when this first happened such as fever or sore throat.  The cough is been about the same since that time.  In the past few days she has started developing some postnasal drainage.  However, she denies sinus pain or pressure and does not have any nasal discharge.  She denies any history of asthma and never smoked cigarettes.  She does not have any associated wheezing or chest pain.  She also denies any shortness of breath.      Reviewed: The following portions of the patient's history were reviewed and updated as appropriate: allergies, current medications, past family history, past medical history, past social history, past surgical history and problem list.    Review of Systems  Pertinent items are noted in HPI.        Objective:     [facility] 12/30/2008   General appearance: alert, appears stated age and cooperative  Lungs: clear to auscultation bilaterally  Heart: regular rate and rhythm, S1, S2 normal, no murmur, click, rub or gallop      This note has been dictated using voice recognition software. Any grammatical or context distortions are unintentional and inherent to the software

## 2021-06-12 NOTE — PROGRESS NOTES
"HPI: I am consulted by Ema Hart MD in this 61 y.o. female regarding a thyroid nodule. She has had  an indeterminate FNA. She has noted this for about 6 weeks.  She was having trouble with shoulder pain, and had an MRI at which time the thyroid nodules were detected.  No difficulty swallowing, breathing and voice changes.  She has not had  weight gain, weight loss, diarrhea, palpitations, tremulousness, sweating and hair or skin changes.    No Known Allergies    No current outpatient prescriptions on file.    Past Medical History:   Diagnosis Date     Knee osteoarthritis        Past Surgical History:   Procedure Laterality Date     CHOLECYSTECTOMY       HAND SURGERY       LA KNEE SCOPE,DIAGNOSTIC      Description: Arthroscopy Knee Left;  Proc Date: 10/12/1999;  Comments: ACL tear- not repaired; menisicus repaired     LA LAP,CHOLECYSTECTOMY      Description: Cholecystectomy Laparoscopic;  Proc Date: 10/12/1992;     LA REMOVAL GALLBLADDER      Description: Cholecystectomy;  Recorded: 11/06/2014;     LA REPAIR CRUCIATE LIGAMENT,KNEE      Description: Primary Repair Of Knee Ligament Cruciate Anterior;  Recorded: 11/06/2014;       Social History     Social History     Marital status:      Spouse name: N/A     Number of children: N/A     Years of education: N/A     Occupational History     Not on file.     Social History Main Topics     Smoking status: Never Smoker     Smokeless tobacco: Never Used     Alcohol use Yes      Comment: occasionally     Drug use: No     Sexual activity: Not on file     Other Topics Concern     Not on file     Social History Narrative       Review of Systems - Negative except as noted in the HPI    /79 (Patient Site: Right Arm, Patient Position: Sitting, Cuff Size: Adult Regular)  Pulse 63  Ht 5' 1\" (1.549 m)  Wt 171 lb 8 oz (77.8 kg)  LMP 12/30/2008  SpO2 98%  Breastfeeding? No  BMI 32.4 kg/m2  Body mass index is 32.4 kg/(m^2).    EXAM:  GENERAL: This is a well " developed female in no distress.  SKIN: Grossly intact  EYES: No icterus  CARDIAC: RRR w/out murmur  CHEST/LUNG: Clear  THYROID:  3 cm nodule right lobe; 1 cm nodule left lobe  NECK: No cervical adenopathy.   NEURO: Alert and oriented  AFFECT: Pleasant    LABS:     Ref Range & Units 6/28/17  2:29 PM     TSH 0.30 - 5.00 uIU/mL 0.71       IMAGES:   Us Thyroid    Result Date: 6/27/2017   US THYROID 6/27/2017 2:28 PM INDICATION: nodule TECHNIQUE: Routine. COMPARISON: None. FINDINGS: RIGHT thyroid lobe measures 5.6 x 3 x 2.8 cm. Heterogeneous nodular echotexture. Mid pole 2.6 x 2.6 x 1.9 cm nodule. Adjacent lower pole 3.7 x 3.4 x 2.5 cm nodule. Nodule: Mid pole 2.6 cm. Composition: 1 Echogenicity: 2 Shape: 0 Margin: 0 Echogenic Foci: 3 Point Total: 6 Nodule: Lower pole 3.7 cm. Composition: 2 Echogenicity: 2 Shape: 0 Margin: 0 Echogenic Foci: 3 Point Total: 7 LEFT thyroid lobe measures 3.7 x 1.9 x 1.4 cm. Mildly heterogeneous echotexture with a mid pole 1.4 x 1.1 x 0.9 cm nodule. Nodule: Mid pole 1.4 cm Composition: 2 Echogenicity: 2 Shape: 0 Margin: 0 Echogenic Foci: 3 Point Total: 7  Isthmus measures 4 mm. Isthmic 6 mm hypoechoic nodule. No cervical lymphadenopathy.     CONCLUSION: 1.  Right thyroid 3.7 cm TI-RADS 5 nodule. 2.  Right thyroid 2.6 cm TI-RADS 4 nodule. 3.  Left thyroid 1.4 cm TI-RADS 5 nodule. 4.  Recommend FNA of the right thyroid 3.7 and left thyroid 1.4 cm nodules. TI-RADS 1. Benign. No FNA. TI-RADS 2. Not suspicious. No FNA. TI-RADS 3. Mildly suspicious. FNA if >=2.5 cm. Follow up ultrasound in 1 year >=1.5 cm. TI-RADS 4. Moderately suspicious. FNA if >=1.5cm. Follow up ultrasound in 1 year >=1 cm. TI-RADS 5. Highly suspicious. FNA if >=1 cm. Follow up ultrasound in 1 year >=0.5 cm. -FNA no more than 2 of the most suspicious nodules, if FNA indicated. -Follow no more than 4 of the most suspicious nodules. ACR Thyroid Imaging, Reporting and Data System (TI-RADS): White Paper of the ACR TI-RADS Committee  Kali Castellanos et al. Journal of the American College of Radiology 2017. Volume 14 (2017), Issue 5, 079-722.    Us Thyroid Biopsy    Result Date: 6/29/2017  1.  FINE-NEEDLE ASPIRATION LEFT THYROID NODULE 2.  FINE-NEEDLE ASPIRATION RIGHT THYROID NODULE 3.  ULTRASOUND GUIDANCE 6/29/2017 3:14 PM INDICATION: Bilateral thyroid nodules- please biopsy the two level 5 nodules. PROCEDURE: Preprocedure ultrasound evaluation again demonstrates the dominant solid heterogeneous nodule of the inferior right thyroid lobe which measures 4.0 x 4.0 x 3.2 cm and the hypoechoic solid nodule of the left lower pole measuring 1.4 x 0.9 x 0.9  cm. Both nodules are indicated for biopsy. Informed consent obtained. Time out performed. The neck was prepped and draped in sterile fashion. 10 mL of 1% lidocaine was infused into the local soft tissues. Under direct ultrasound guidance, multiple fine-needle aspirates of the left thyroid nodule were obtained. In similar fashion under continuous ultrasound guidance, multiple fine-needle aspirates of the right thyroid nodule were obtained. The tissue was felt to be adequate by pathology. RADIOLOGIC SUPERVISION AND INTERPRETATION: ULTRASOUND GUIDANCE: Images demonstrate the needle within both thyroid nodules.     CONCLUSION: 1.  Successful ultrasound-guided fine-needle aspiration of dominant solid right thyroid nodule. 2.  Successful ultrasound-guided fine-needle aspiration of solitary solid left thyroid nodule.    CYTOLOGY  A) ULTRASOUND-GUIDED NEEDLE ASPIRATION OF LEFT THYROID GLAND NODULE:         - CYTOLOGIC FINDINGS MOST CONSISTENT WITH COLLOID NODULE         - NEGATIVE FOR ATYPICAL OR MALIGNANT CELLS         - NO EVIDENCE OF PAPILLARY EPITHELIAL GROWTH     B) ULTRASOUND-GUIDED NEEDLE ASPIRATION OF RIGHT THYROID GLAND NODULE:         - HIGHLY CELLULAR SPECIMEN, WITH ATYPICAL EPITHELIAL CELLS             (SEE COMMENT BELOW)         - NEGATIVE FOR CYTOLOGICALLY MALIGNANT CELLS      Assessment/Plan: Ms. Valdez has an indeterminate thyroid nodule.  It is large, and there are atypical cells.   We discussed thyroid lobectomy with possible total thyroidectomy. Risks addressed included death, bleeding and infection, but I specifically concentrated on recurrent laryngeal nerve injury and hypoparathyroidism. She may need thyroid hormone replacement following the operation.  She understood the discussion, and wishes to proceed with surgery. We will set up the surgery.    Adrián Johnson MD  [facility] Department of Surgery

## 2021-06-12 NOTE — PROGRESS NOTES
I received a call from Gaby today. She would like to cancel her surgery for tomorrow with Dr. Johnson. She did not give a reason for canceling. She also does not want to reschedule at this time. I told her when she is ready, we are more than happy to get her back on the schedule.    Called [facility] to cancel the case.    Sugey Hooker [facility]  Physician    [facility]   [phone]

## 2021-06-12 NOTE — TELEPHONE ENCOUNTER
RN triage call from patient  Patient reports having a dry cough all of September  Not productive at all no matter how hard she coughs  No fever no chest pain  No other symptoms no exposures to COVID19  Daughter is a nurse who listened to lungs and they were fine but her daughter wanted her to be seen to have a Chest xray  Patient states if she takes a real deep breath she can hear a wheeze  Is able to walk and do normal activities  Patient states she has thyroid issues and wonders if this would cause for any concern  Gave disposition for in office visit today and patient was agreeable  Reviewed home cares and signs and symptoms of when to call back  Warm transferred to scheduling  Joanna Jean-Baptiste, RN  [facility] Triage Nurse  9:35 AM  10/5/2020            Reason for Disposition    Patient wants to be seen    Additional Information    Negative: Chest pain present when not coughing    Negative: Difficulty breathing    Negative: Passed out (i.e., fainted, collapsed and was not responding)    Negative: Coughed up > 1 tablespoon (15 ml) blood (Exception: blood-tinged sputum)    Negative: Fever > 103 F (39.4 C)    Negative: Fever > 101 F (38.3 C) and over 60 years of age    Negative: Fever > 100.0 F (37.8 C) and has diabetes mellitus or a weak immune system (e.g., HIV positive, cancer chemotherapy, organ transplant, splenectomy, chronic steroids)    Negative: Fever > 100.0 F (37.8 C) and bedridden (e.g., nursing home patient, stroke, chronic illness, recovering from surgery)    Negative: Increasing ankle swelling    Negative: Wheezing is present    Protocols used: COUGH-A-OH

## 2021-06-12 NOTE — PROGRESS NOTES
Assessment and Plan:   Gaby is a very pleasant 65-year-old female presenting to the clinic today for a welcome to Medicare visit.    Patient has been advised of split billing requirements and indicates understanding: Yes  1. Routine general medical examination at a health care facility  - Ambulatory referral for Colonoscopy    2. Encounter for screening for diabetes mellitus  - Glucose    3. Encounter for screening for lipoid disorders  - Lipid [place], FASTING; Future  - Lipid [place], FASTING    4. Postmenopausal  - DXA Bone Density Scan; Future    5. Influenza vaccine needed  - Influenza,Quad,High Dose,PF 65 YR+    6. Immunization due  - Pneumococcal conjugate vaccine 13-valent 6wks-17yrs; >50yrs    7. Abnormal ultrasound of thyroid gland  Potentially this would be causing her cough if there are some compressive symptoms.  Thyroid ultrasound will be done.  If thyroid is unchanged would potentially recommend either a short course of antibiotics to see if this is potentially draining sinus infection or potentially trying a reflux medication to see if it helps.  - US Thyroid; Future     The patient's current medical problems were reviewed.    I have had an Advance Directives discussion with the patient.  The following high BMI interventions were performed this visit: encouragement to exercise and weight monitoring  The following health maintenance schedule was reviewed with the patient and provided in printed form in the after visit summary:   Health Maintenance   Topic Date Due     HEPATITIS C SCREENING  1955     HIV SCREENING  09/05/1970     ADVANCE CARE PLANNING  09/05/1973     INFLUENZA VACCINE RULE BASED (1) 08/01/2020     Pneumococcal Vaccine: 65+ Years (1 of 1 - PPSV23) 09/05/2020     DXA SCAN  09/05/2020     MEDICARE ANNUAL WELLNESS VISIT  11/04/2021     FALL RISK ASSESSMENT  11/04/2021     MAMMOGRAM  02/14/2022     LIPID  10/01/2024     TD 18+ HE  10/01/2029     COLORECTAL CANCER SCREENING   "06/01/2030     TDAP ADULT ONE TIME DOSE  Completed     ZOSTER VACCINES  Completed     Pneumococcal Vaccine: Pediatrics (0 to 5 Years) and At-Risk Patients (6 to 64 Years)  Aged Out     HEPATITIS B VACCINES  Aged Out        Subjective:   Chief Complaint: Yana Valdez is an 65 y.o. female here for a Welcome to Medicare visit.   HPI: Gaby is a very pleasant 65-year-old female presenting to the clinic today for a welcome to Medicare visit.    She is overall doing fairly well.  She has unfortunately been having a dry cough for the last few months.  She states that she feels as though she gets a \"tickle\" in her throat and needs to cough it up.  It is not related to eating.  She did try an allergy medication which did not seem to help.    She does note that somewhat is positional and a bit worse when she is looking downward (like when she is trying to read).      Review of Systems:  Please see above.  The rest of the review of systems are negative for all systems.    Patient Care Team:  Ema Hart MD as PCP - General (Family Medicine)  Nelli Yang MD as Assigned PCP     Patient Active Problem List   Diagnosis     Abnormal ultrasound of thyroid gland     Past Medical History:   Diagnosis Date     Knee osteoarthritis       Past Surgical History:   Procedure Laterality Date     CHOLECYSTECTOMY       HAND SURGERY       [place] KNEE SCOPE,DIAGNOSTIC      Description: Arthroscopy Knee Left;  Proc Date: 10/12/1999;  Comments: ACL tear- not repaired; menisicus repaired     RI LAP,CHOLECYSTECTOMY      Description: Cholecystectomy Laparoscopic;  Proc Date: 10/12/1992;     RI REMOVAL GALLBLADDER      Description: Cholecystectomy;  Recorded: 11/06/2014;     RI REPAIR CRUCIATE LIGAMENT,KNEE      Description: Primary Repair Of Knee Ligament Cruciate Anterior;  Recorded: 11/06/2014;      Family History   Problem Relation Age of Onset     Breast cancer Mother 43     Dementia Mother         lewy body     " Hypertension Father      Diabetes Father      Breast cancer Sister 43     Thyroid nodules Sister      Diabetes Brother      Thyroid nodules Brother      Diabetes Brother      Thyroid nodules Sister       Social History     Socioeconomic History     Marital status:      Spouse name: Not on file     Number of children: Not on file     Years of education: Not on file     Highest education level: Not on file   Occupational History     Not on file   Social Needs     Financial resource strain: Not on file     Food insecurity     Worry: Not on file     Inability: Not on file     Transportation needs     Medical: Not on file     Non-medical: Not on file   Tobacco Use     Smoking status: Never Smoker     Smokeless tobacco: Never Used   Substance and Sexual Activity     Alcohol use: Yes     Comment: occasionally     Drug use: No     Sexual activity: Not on file   Lifestyle     Physical activity     Days per week: Not on file     Minutes per session: Not on file     Stress: Not on file   Relationships     Social connections     Talks on phone: Not on file     Gets together: Not on file     Attends Confucianism service: Not on file     Active member of club or organization: Not on file     Attends meetings of clubs or organizations: Not on file     Relationship status: Not on file     Intimate partner violence     Fear of current or ex partner: Not on file     Emotionally abused: Not on file     Physically abused: Not on file     Forced sexual activity: Not on file   Other Topics Concern     Not on file   Social History Narrative     Not on file       Current Outpatient Medications   Medication Sig Dispense Refill     ascorbic acid, vitamin C, (VITAMIN C) 1000 MG tablet Take 1 tablet by mouth.       calcium carbonate-vitamin D3 (CALTRATE 600 PLUS D3) 600 mg(1,500mg) -400 unit per tablet Take 1 tablet by mouth.       valACYclovir (VALTREX) 1000 MG tablet TAKE 2 TABLETS BY MOUTH AT THE ONSET OF THE COLD SORE AND 1 TABLET 4  "HOURS LATER. CAN REPEAT IF NEEDED FOR 3 DAYS 20 tablet 11     No current facility-administered medications for this visit.       Objective:   Vital Signs:   Visit Vitals  /83   Pulse (!) 54   Temp 98.3  F (36.8  C) (Oral)   Resp 12   Ht 5' 1.22\" (1.555 m)   Wt 172 lb (78 kg)   LMP 12/30/2008   Breastfeeding No   BMI 32.27 kg/m         EKG:  Not done    VisionScreening:   Hearing Screening    125Hz 250Hz 500Hz 1000Hz 2000Hz 3000Hz 4000Hz 6000Hz 8000Hz   Right ear:            Left ear:               Visual Acuity Screening    Right eye Left eye Both eyes   Without correction:      With correction: 20/32 20/20 20/20        PHYSICAL EXAM    Physical Exam:  General Appearance: Alert, cooperative, no distress, appears stated age   Head: Normocephalic, without obvious abnormality, atraumatic  Eyes: PERRL, conjunctiva/corneas clear, EOM's intact   Ears: Normal TM's and external ear canals, both ears  Nose:Nares normal, septum midline,mucosa normal, no drainage    Throat:Lips, mucosa, and tongue normal; teeth and gums normal  Neck: Supple, symmetrical, trachea midline, no adenopathy;  thyroid: not enlarged, symmetric, no tenderness/mass/nodules  Back: Symmetric, no curvature, ROM normal,  Lungs: Clear to auscultation bilaterally, respirations unlabored  Breasts: No breast masses, tenderness, asymmetry, or nipple discharge.  Heart: Regular rate and rhythm, S1 and S2 normal, no murmur, rub, or gallop  Abdomen: Soft, non-tender, bowel sounds active all four quadrants,  no masses, no organomegaly  Extremities: Extremities normal, atraumatic, no cyanosis or edema  Skin: Skin color, texture, turgor normal, no rashes or lesions  Lymph nodes: Cervical, supraclavicular, and axillary nodes normal and   Neurologic: Normal            Assessment Results 11/4/2020   Activities of Daily Living No help needed   Instrumental Activities of Daily Living No help needed   Mini Cog Total Score 5   Some recent data might be hidden     A Mini " Cog score of 0-2 suggests the possibility of dementia, score of 3-5 suggests no dementia    Identified Health Risks:     The patient was counseled and encouraged to consider modifying their diet and eating habits. She was provided with information on recommended healthy diet options.  Information regarding advance directives (living [place]), including where she can download the appropriate form, was provided to the patient via the AVS.

## 2021-06-12 NOTE — PROGRESS NOTES
Assessment/Plan:      Visit for Preoperative Exam.     Patient approved for surgery with general or local anesthesia. Follow up as needed. Proceed with proposed surgery without additional clinical clarifications.     Subjective:     Scheduled Procedure: Partial Thyroid Removal  Surgery Date:  8/15/17  Surgery Location:  [facility]  Surgeon:  Dr Alex Millan is a 61-year-old female presenting to the clinic today for a preoperative examination for a partial thyroid removal.  The patient has several thyroid nodules -a 3.7 cm nodule as well as a 2.4 cm one on the right.  She also has a nodule on the left.  These were biopsied and found to be benign however given the size of the nodules on the right she will be getting the right side of her thyroid removed and the entire nodule sent to pathology.    No current outpatient prescriptions on file.     No current facility-administered medications for this visit.        No Known Allergies    Immunization History   Administered Date(s) Administered     Influenza, seasonal,quad inj 36+ mos 12/18/2015, 01/09/2017     Influenza, seasonal,quad inj 6-35 mos 11/06/2014     Td, historic 06/14/2000, 07/14/2000, 10/12/2009     Tdap 10/12/2009     ZOSTER 12/18/2015       There is no problem list on file for this patient.      Past Medical History:   Diagnosis Date     Knee osteoarthritis        Family History   Problem Relation Age of Onset     Breast cancer Mother 43     Dementia Mother      lewy body     Hypertension Father      Diabetes Father      Breast cancer Sister 43     Thyroid nodules Sister      Diabetes Brother      Thyroid nodules Brother      Diabetes Brother      Thyroid nodules Sister        Social History     Social History     Marital status:      Spouse name: N/A     Number of children: N/A     Years of education: N/A     Occupational History     Not on file.     Social History Main Topics     Smoking status: Never Smoker     Smokeless tobacco: Never Used      Alcohol use Yes      Comment: occasionally     Drug use: No     Sexual activity: Not on file     Other Topics Concern     Not on file     Social History Narrative       Past Surgical History:   Procedure Laterality Date     CHOLECYSTECTOMY       HAND SURGERY       [place] KNEE SCOPE,DIAGNOSTIC      Description: Arthroscopy Knee Left;  Proc Date: 10/12/1999;  Comments: ACL tear- not repaired; menisicus repaired     HI LAP,CHOLECYSTECTOMY      Description: Cholecystectomy Laparoscopic;  Proc Date: 10/12/1992;     HI REMOVAL GALLBLADDER      Description: Cholecystectomy;  Recorded: 11/06/2014;     [place] REPAIR CRUCIATE LIGAMENT,KNEE      Description: Primary Repair Of Knee Ligament Cruciate Anterior;  Recorded: 11/06/2014;       History of Present Illness  Recent Health  Fever: no  Chills: no  Fatigue: no  Chest Pain: no  Cough: no  Dyspnea: no  Urinary Frequency: no  Nausea: no  Vomiting: no  Diarrhea: no  Abdominal Pain: no  Easy Bruising: no  Lower Extremity Swelling: no  Poor Exercise Tolerance: no    Most recent Health Maintenance Visit:  6 month(s) ago    Pertinent History  Prior Anesthesia: yes  Previous Anesthesia Reaction:  Nausea/vomiting  Diabetes: no  Cardiovascular Disease: no  Pulmonary Disease: no  Renal Disease: no  GI Disease: no  Sleep Apnea: no  Thromboembolic Problems: no  Clotting Disorder: no  Bleeding Disorder: no  Transfusion Reaction: no  Impaired Immunity: no  Steroid use in the last 6 months: medrol dose pack in March  Frequent Aspirin use: no    Family history: See above - no family Hx of easy bruising/bleeding or anesthesia reactions    Social history of there is no transfusion refusal and there are no concerns regarding care after surgery    After surgery, the patient plans to recover at home with family.    Review of Systems  Pertinent items are noted in HPI.  Constitutional: negative  Eyes: negative  Ears, nose, mouth, throat, and face: negative  Respiratory: negative  Cardiovascular:  "negative  Gastrointestinal: negative  Genitourinary:negative  Integument/breast: negative  Hematologic/lymphatic: negative  Musculoskeletal:negative  Neurological: negative  Behavioral/Psych: negative  Endocrine: negative  Allergic/Immunologic: negative          Objective:         Vitals:    08/02/17 1448   BP: 108/72   Pulse: 78   Resp: 16   Temp: 98.2  F (36.8  C)   TempSrc: Oral   Weight: 171 lb 3.2 oz (77.7 kg)   Height: 5' 1\" (1.549 m)       Vital signs reviewed and stable  General: No acute distress  Psych: Appropriate affect  HEENT: moist mucous membranes, pupils equal, round, reactive to light and accomodation, posterior oropharynx clear of erythema or exudate, tympanic membranes are pearly grey bilaterally  Lymph: no cervical or supraclavicular lymphadenopathy  Cardiovascular: regular rate and rhythm with no murmur  Pulmonary: clear to auscultation bilaterally with no wheeze  Abdomen: soft, non tender, non distended with normo-active bowel sounds  Extremities: warm and well perfused with no edema  Skin: warm and dry with no rash       Recent Results (from the past 240 hour(s))   Electrocardiogram Perform and Read   Result Value Ref Range    SYSTOLIC BLOOD PRESSURE  mmHg    DIASTOLIC BLOOD PRESSURE  mmHg    VENTRICULAR RATE 63 BPM    ATRIAL RATE 63 BPM    P-R INTERVAL 158 ms    QRS DURATION 70 ms    Q-T INTERVAL 402 ms    QTC CALCULATION (BEZET) 411 ms    P Axis 17 degrees    R AXIS -1 degrees    T AXIS 0 degrees    MUSE DIAGNOSIS       Normal sinus rhythm  Normal ECG  When compared with ECG of 13-MAR-2008 22:37,  No significant change was found     Hemoglobin   Result Value Ref Range    Hemoglobin 13.6 12.0 - 16.0 g/dL       "

## 2021-06-12 NOTE — PROGRESS NOTES
Yana is scheduled for right thyroid lobectomy with Dr. Johnson on 8/15/17 at [facility]. Patient was given instructions of arrival time, need a , pre-op physical within 30days and NPO after midnight. Patient verbalized understanding.     Sugey Hooker [facility]  Physician    [facility]   [phone]

## 2021-06-14 NOTE — PROGRESS NOTES
Preoperative Exam    Scheduled Procedure: Thyroid Biopsy  Surgery Date:  12/8/17  Surgery Location: [facility], fax [phone]    Surgeon:  , Dr. Sherren    Assessment/Plan:     1. Preoperative clearance  - Hemoglobin  - Thyroid [place]      Surgical Procedure Risk: Low (reported cardiac risk generally < 1%)  Have you had prior anesthesia?: yes  Have you or any family members had a previous anesthesia reaction:  no  Do you or any family members have a history of a clotting or bleeding disorder?:  No  Cardiac Risk Assessment: no increased risk for major cardiac complications    Patient approved for surgery with general or local anesthesia.      Functional Status: Independant  Patient plans to recover at home with family.     Subjective:      Yana Valdez is a 62 y.o. female who presents for a preoperative consultation.  She is having a thyroid nodule biopsied.  She is a large nodule in her right thyroid and was actually going to get a hemithyroidectomy however she had a second opinion and they recommended that she just repeat a biopsy.  She states that they are doing this at [facility].  She is unsure what type of anesthesia they are using.    She is not having any pain in her throat or trouble swallowing.  She does not feel as though the nodules have grown.    An EKG in August was normal.  Lab work will be below.    All other systems reviewed and are negative, other than those listed in the HPI.    Pertinent History  Do you have difficulty breathing or chest pain after walking up a flight of stairs: No  History of obstructive sleep apnea: No  Steroid use in the last 6 months: No  Frequent Aspirin/NSAID use: No  Prior Blood Transfusion: No  Prior Blood Transfusion Reaction: No  Blood Transfusion Statement:  There is no transfusion refusal.    Current Outpatient Prescriptions   Medication Sig Dispense Refill     valACYclovir (VALTREX) 1000 MG tablet Take two tablets at the onset of the cold  "sore and one tablet 4 hours later.  Can repeat if needed X 3 days 20 tablet 0     No current facility-administered medications for this visit.         No Known Allergies    There is no problem list on file for this patient.      Past Medical History:   Diagnosis Date     Knee osteoarthritis        Past Surgical History:   Procedure Laterality Date     CHOLECYSTECTOMY       HAND SURGERY       [place] KNEE SCOPE,DIAGNOSTIC      Description: Arthroscopy Knee Left;  Proc Date: 10/12/1999;  Comments: ACL tear- not repaired; menisicus repaired     [place] LAP,CHOLECYSTECTOMY      Description: Cholecystectomy Laparoscopic;  Proc Date: 10/12/1992;     OH REMOVAL GALLBLADDER      Description: Cholecystectomy;  Recorded: 11/06/2014;     [place] REPAIR CRUCIATE LIGAMENT,KNEE      Description: Primary Repair Of Knee Ligament Cruciate Anterior;  Recorded: 11/06/2014;       Social History     Social History     Marital status:      Spouse name: N/A     Number of children: N/A     Years of education: N/A     Occupational History     Not on file.     Social History Main Topics     Smoking status: Never Smoker     Smokeless tobacco: Never Used     Alcohol use Yes      Comment: occasionally     Drug use: No     Sexual activity: Not on file     Other Topics Concern     Not on file     Social History Narrative         Objective:     Vitals:    11/29/17 1031   BP: 110/70   Pulse: 64   Resp: 16   Temp: 98.3  F (36.8  C)   TempSrc: Oral   Weight: 174 lb 5 oz (79.1 kg)   Height: 5' 1\" (1.549 m)   LMP: 12/30/2008         Physical Exam:  Objective:  Vital signs reviewed and stable  General: No acute distress  Psych: Appropriate affect  HEENT: moist mucous membranes, pupils equal, round, reactive to light and accomodation, posterior oropharynx clear of erythema or exudate, tympanic membranes are pearly grey bilaterally  Lymph: no cervical or supraclavicular lymphadenopathy  Cardiovascular: regular rate and rhythm with no murmur  Pulmonary: clear to " auscultation bilaterally with no wheeze  Abdomen: soft, non tender, non distended with normo-active bowel sounds  Extremities: warm and well perfused with no edema  Skin: warm and dry with no rash      Patient Instructions     Hold all supplements, aspirin and NSAIDs for 7 days prior to surgery.  Follow your surgeon's direction on when to stop eating and drinking prior to surgery.  Your surgeon will be managing your pain after your surgery.    Remove all jewelry and metal piercings before your surgery.   Remove nail polish from fingers before surgery.      Labs:  Recent Results (from the past 48 hour(s))   Hemoglobin    Collection Time: 11/29/17 10:48 AM   Result Value Ref Range    Hemoglobin 13.1 12.0 - 16.0 g/dL        Immunization History   Administered Date(s) Administered     Influenza, seasonal,quad inj 36+ mos 12/18/2015, 01/09/2017, 10/26/2017     Influenza, seasonal,quad inj 6-35 mos 11/06/2014     Td,adult,historic,unspecified 06/14/2000, 07/14/2000, 10/12/2009     Tdap 10/12/2009     ZOSTER 12/18/2015           Electronically signed by Ema Hart MD 11/29/17 11:19 AM

## 2021-06-16 PROBLEM — R93.89 ABNORMAL ULTRASOUND OF THYROID GLAND: Status: ACTIVE | Noted: 2017-06-27

## 2021-06-17 NOTE — PROGRESS NOTES
Nas Inclusion/Exclusion Criteria:     Study Name: Nas   : Otto Zaidi MD    Protocol version: 1.2, 08 Mar 2021     Criteria # Inclusion Criteria (ALL MUST BE YES)  YES/NO   1 Able to read, understand, and provide written informed consent Yes   2 Willing and able to participate in the study procedures as described in the consent form  Yes   3 Individuals who are 22 years of age and older Yes   4 Able to communicate effectively with and follow instructions from the study staff Yes   5 A wrist circumference between 125-190 mm (subject must be able to wear the DCD)  Yes   6 For subjects enrolled into the AF population, subjects must have a known diagnosis of AF and be in AF at the time of screening. Subjects may have any type of AF including paroxysmal, persistent, or permanent AF, though persistent and permanent AF are preferred as subjects with persistent and permanent AF are more likely to be in AF at the time of screening.  N/A       Criteria #  Exclusion Criteria (ALL MUST BE NO)  YES/NO    1 Physical disability that precludes safe an adequate testing  No   2 Mental impairment resulting in limited ability to cooperate  No   3 Subjects with a pacemaker or implantable cardioverter-defibrillator (ICD)  No   4 Acute myocardial infarction within 90 days of screening or other cardiovascular disease that, in the opinion of the Investigator, increases the risk to the subject or renders data uninterruptable (e.g. recent or ongoing unstable angina, significant valvular heart disease or heart failure, myocarditis, or pericarditis)  No   5 Acute pulmonary embolism, pulmonary infarction, or deep vein thrombosis within 90 days of screening  No   6 Stroke or transient ischemic attack within 90 days of screening No   7 Subjects taking rhythm control drugs (e.g., disopyramide, quinidine, flecainide, propafenone, amiodarone, dofetilide, dronedarone, sotalol, procainamide, ibutilide, moricizine, or  procainamide.) For clarity and in contrast, rate control, anticoagulants, and anti-platelet medications are permitted (e.g., metoprolol, atenolol, diltiazem, coumadin, clopidogrel, or aspirin)     Exception: subjects who are undergoing scheduled cardioversion for known AF within 30 days after study participation are allowed to participate in the study even when on rhythm control drugs  No   8 Symptomatic (or active) allergic skin reaction such as eczema, rosacea, impetigo, dermatomyositis, or allergic contact dermatitis on both wrists or over electrode attachment sites, including known allergy or sensitivity to silicone bands primarily used in wrist-worn fitness devices No   9 Known sensitivity to medial adhesives, isopropyl alcohol, watch bands, or ECG electrodes No   10 A history or abnormal life-threatening rhythms as determined by the Investigator (e.g., ventricular tachycardia, ventricular fibrillation, 3rd-degree heart block, resting heart rate < 50 bpm, or resting heart rate > 120 bpm)  No   11 Significant tremor that prevents subject from being able to hold still  No   12 Women who are pregnant at the time of study participation  No   13 Subjects enrolled into the SR population must not have any diagnosis of AF. For example, subjects cannot be enrolled into the SR population if they have a diagnosis of paroxysmal AF but during screening are in SR No     Patient fulfills inclusion criteria, no exclusion criteria are present.  Otto Zaidi MD

## 2021-06-18 NOTE — PATIENT INSTRUCTIONS - HE
Patient Instructions by Ema Hart MD at 11/4/2020 10:00 AM     Author: Ema Hart MD Service: -- Author Type: Physician    Filed: 11/4/2020 10:21 AM Encounter Date: 11/4/2020 Status: Signed    : Ema Hart MD (Physician)         Patient Education   Understanding USDA MyPlate  The USDA (US Department of Agriculture) has guidelines to help you make healthy food choices. These are called MyPlate. MyPlate shows the food groups that make up healthy meals using the image of a place setting. Before you eat, think about the healthiest choices for what to put onto your plate or into your cup or bowl. To learn more about building a healthy plate, visit www.choosemyplate.gov.       The Food Groups    Fruits: Any fruit or 100% fruit juice counts as part of the Fruit Group. Fruits may be fresh, canned, frozen, or dried, and may be whole, cut-up, or pureed. Make half your plate fruits and vegetables.    Vegetables: Any vegetable or 100% vegetable juice counts as a member of the Vegetable Group. Vegetables may be fresh, frozen, canned, or dried. They can be served raw or cooked and may be whole, cut-up, or mashed. Make half your plate fruits and vegetables.     Grains: All foods made from grains are part of the Grains Group. These include wheat, rice, oats, cornmeal, and barley such as bread, pasta, oatmeal, cereal, tortillas, and grits. Grains should be no more than a quarter of your plate. At least half of your grains should be whole grains.    Protein: This group includes meat, poultry, seafood, beans and peas, eggs, processed soy products (like tofu), nuts (including nut butters), and seeds. Make protein choices no more than a quarter of your plate. Meat and poultry choices should be lean or low fat.    Dairy: All fluid milk products and foods made from milk that contain calcium, like yogurt and cheese are part of the Dairy Group. (Foods that have little calcium, such as cream,  butter, and cream cheese, are not part of the group.) Most dairy choices should be low-fat or fat-free.    Oils: These are fats that are liquid at room temperature. They include canola, corn, olive, soybean, and sunflower oil. Foods that are mainly oil include mayonnaise, certain salad dressings, and soft margarines. You should have only 5 to 7 teaspoons of oils a day. You probably already get this much from the food you eat.  Use iPAYster to Help Build Your Meals  The SuperTracker can help you plan and track your meals and activity. You can look up individual foods to see or compare their nutritional value. You can get guidelines for what and how much you should eat. You can compare your food choices. And you can assess personal physical activities and see ways you can improve. Go to www.Next Safety.gov/Gage[employer]cker/.    9141-7557 The [employer]. [address] [place] [address]. All rights reserved. This information is not intended as a substitute for professional medical care. Always follow your healthcare professional's instructions.           Patient Education   Understanding Advance Care Planning  Advance care planning is the process of deciding ones own future medical care. It helps ensure that if you cant speak for yourself, your wishes can still be carried out. The plan is a series of legal documents that note a persons wishes. The documents vary by state. Advance care planning may be done when a person has a serious illness that is expected to get worse. It may be done before major surgery. And it can help you and your family be prepared in case of a major illness or injury. Advance care planning helps with making decisions at these times.       A health care proxy is a person who acts as the voice of a patient when the patient cant speak for himself or herself. The name of this role varies by state. It may be called a Durable Medical Power of  or Durable Power of   for Healthcare. It may be called an agent, surrogate, or advocate. Or it may be called a representative or decision maker. It is an official duty that is identified by a legal document. The document also varies by state.    Why Is Advance Care Planning Important?  If a person communicates their healthcare wishes:    They will be given medical care that matches their values and goals.    Their family members will not be forced to make decisions in a crisis with no guidance.  Creating a Plan  Making an advance care plan is often done in 3 steps:    Thinking about ones wishes. To create an advance care plan, you should think about what kind of medical treatment you would want if you lose the ability to communicate. Are there any situations in which you would refuse or stop treatment? Are there therapies you would want or not want? And whom do you want to make decisions for you? There are many places to learn more about how to plan for your care. Ask your doctor or  for resources.    Picking a health care proxy. This means choosing a trusted person to speak for you only when you cant speak for yourself. When you cannot make medical decisions, your proxy makes sure the instructions in your advance care plan are followed. A proxy does not make decisions based on his or her own opinions. They must put aside those opinions and values if needed, and carry out your wishes.    Filling out the legal documents. There are several kinds of legal documents for advance care planning. Each one tells health care providers your wishes. The documents may vary by state. They must be signed and may need to be witnessed or notarized. You can cancel or change them whenever you wish. Depending on your state, the documents may include a Healthcare Proxy form, Living Will, Durable Medical Power of , Advance Directive, or others.  The Familys Role  The best help a family can give is to support their loved ones wishes. Open  and honest communication is vital. Family should express any concerns they have about the patients choices while the patient can still make decisions.    6437-6989 The [employer]. [address] [place] [address], [place] [address]. All rights reserved. This information is not intended as a substitute for professional medical care. Always follow your healthcare professional's instructions.         Also, [facility] offers a free, downloadable health care directive that allows you to share your treatment choices and personal preferences if you cannot communicate your wishes. It also allows you to appoint another person (called a health care agent) to make health care decisions if you are unable to do so. You can download an advance directive by going here: [email or web address]     Patient Education   Personalized Prevention Plan  You are due for the preventive services outlined below.  Your care team is available to assist you in scheduling these services.  If you have already completed any of these items, please share that information with your care team to update in your medical record.  Health Maintenance   Topic Date Due   ? HEPATITIS C SCREENING  1955   ? HIV SCREENING  09/05/1970   ? ADVANCE CARE PLANNING  09/05/1973   ? INFLUENZA VACCINE RULE BASED (1) 08/01/2020   ? Pneumococcal Vaccine: 65+ Years (1 of 1 - PPSV23) 09/05/2020   ? DXA SCAN  09/05/2020   ? MEDICARE ANNUAL WELLNESS VISIT  11/04/2021   ? FALL RISK ASSESSMENT  11/04/2021   ? MAMMOGRAM  02/14/2022   ? LIPID  10/01/2024   ? TD 18+ HE  10/01/2029   ? COLORECTAL CANCER SCREENING  06/01/2030   ? TDAP ADULT ONE TIME DOSE  Completed   ? ZOSTER VACCINES  Completed   ? Pneumococcal Vaccine: Pediatrics (0 to 5 Years) and At-Risk Patients (6 to 64 Years)  Aged Out   ? HEPATITIS B VACCINES  Aged Out

## 2021-06-18 NOTE — PROGRESS NOTES
Assessment/Plan:     Health maintenance female exam.  All questions answered.  Await pap smear results.  Breast self exam technique reviewed and patient encouraged to perform self-exam monthly.  Discussed healthy lifestyle modifications.  Mammogram ordered.  Await fasting lab results  The following high BMI interventions were performed this visit: encouragement to exercise and weight monitoring    1. Thyroid nodule  Results of previous thyroid biopsy reviewed.  We will recheck a thyroid ultrasound and TSH.  I will contact Dr. Elizabeth once the thyroid ultrasound returns to discuss if he would like to see her or if he would recommend a repeat thyroid biopsy.  - US Thyroid; Future  - Thyroid [place]    2. Healthcare maintenance  - Lipid [place]  - Hemoglobin  - Glucose    3. Family hx osteoporosis  - DXA Bone Density Scan; Future    4. Shoulder arthritis  Please see procedure note below.  Patient out of the procedure overall well.  She did have some lightheadedness towards the end but was able to lay down for about 30 seconds and felt much better.  - methylPREDNISolone acetate injection 40 mg (DEPO-MEDROL); Inject 1 mL (40 mg total) into the joint once.          Subjective:      Yana Valdez is a 62 y.o. female who presents for an annual exam.  She is overall doing very well today.    1. Thyroid nodule  The patient has a history of a thyroid nodule which was seen on a previous MRI incidentally.  Ultrasounds confirmed this nodule.  She has now had 2 biopsies the first of which show some Hurthle cells the second of which showed follicular cells.  Given the size of this nodule (3.7 cm) her first surgeon had recommended a lobectomy of the right thyroid lobe but the second surgeon, Dr. Gaitan, recommended continuing to monitor.  She is due for a thyroid ultrasound sometime this month and was hopeful that I could order this for her today.  She has not noticed any compressive symptoms.  She would like to review her  previous thyroid biopsy results as well.    2. Healthcare maintenance  Up-to-date with Pap smear.  She will be due next year.    3. Family hx osteoporosis    4. Shoulder arthritis  We were able to review her chart for this as well.  Seems as though she has been having right-sided shoulder pain for a few years.  She did have a MRI done by [facility] at one point which showed some inflammation in the rotator cuff.  She had a shoulder injection about 1 year ago which she states was beneficial.  She has been thinking about getting another injection.  She has never seen physical therapy in a formal setting but has done some physical therapy exercises at home and does not think that they are overly beneficial.      Healthy Habits:   Regular Exercise: Yes  Sunscreen Use: Yes  Healthy Diet: Yes  Dental Visits Regularly: Yes  Seat Belt: Yes  Sexually active: Yes  Self Breast Exam Monthly:Yes  Colonoscopy: Yes  Prevention of Osteoporosis: Yes  Last Dexa: N/A    Immunization History   Administered Date(s) Administered     Influenza, seasonal,quad inj 36+ mos 2015, 2017, 10/26/2017     Influenza, seasonal,quad inj 6-35 mos 2014     Td, Adult, Absorbed 2000, 2000     Td,adult,historic,unspecified 2000, 2000, 10/12/2009     Tdap 10/12/2009     ZOSTER, LIVE 2015     ZOSTER, RECOMBINANT, IM 2018     Immunization status: up to date and documented.    Gynecologic History  Patient's last menstrual period was 2008.  Contraception: post menopausal status  Last Pap: . Results were: normal  Last mammogram: . Results were: normal      OB History    Para Term  AB Living   2 2 2      SAB TAB Ectopic Multiple Live Births             # Outcome Date GA Lbr Tonny/2nd Weight Sex Delivery Anes PTL Lv   2 Term            1 Term                   Current Outpatient Prescriptions   Medication Sig Dispense Refill     valACYclovir (VALTREX) 1000 MG tablet Take two  tablets at the onset of the cold sore and one tablet 4 hours later.  Can repeat if needed X 3 days 20 tablet 0     No current facility-administered medications for this visit.      Past Medical History:   Diagnosis Date     Knee osteoarthritis      Past Surgical History:   Procedure Laterality Date     CHOLECYSTECTOMY       HAND SURGERY       WA KNEE SCOPE,DIAGNOSTIC      Description: Arthroscopy Knee Left;  Proc Date: 10/12/1999;  Comments: ACL tear- not repaired; menisicus repaired     WA LAP,CHOLECYSTECTOMY      Description: Cholecystectomy Laparoscopic;  Proc Date: 10/12/1992;     WA REMOVAL GALLBLADDER      Description: Cholecystectomy;  Recorded: 11/06/2014;     WA REPAIR CRUCIATE LIGAMENT,KNEE      Description: Primary Repair Of Knee Ligament Cruciate Anterior;  Recorded: 11/06/2014;     Review of patient's allergies indicates no known allergies.  Family History   Problem Relation Age of Onset     Breast cancer Mother 43     Dementia Mother      lewy body     Hypertension Father      Diabetes Father      Breast cancer Sister 43     Thyroid nodules Sister      Diabetes Brother      Thyroid nodules Brother      Diabetes Brother      Thyroid nodules Sister      Social History     Social History     Marital status:      Spouse name: N/A     Number of children: N/A     Years of education: N/A     Occupational History     Not on file.     Social History Main Topics     Smoking status: Never Smoker     Smokeless tobacco: Never Used     Alcohol use Yes      Comment: occasionally     Drug use: No     Sexual activity: Not on file     Other Topics Concern     Not on file     Social History Narrative       Review of Systems  General:  Denies problems  Eyes:  Denies problems  Ears/Nose/Throat:  Denies problems  Cardiovascular:  Denies problems  Respiratory:  Denies problems  Gastrointestinal:  Denies problems  Genitourinary:  Denies problems  Musculoskeletal:  Denies problems  Skin:  Denies problems, Neurologic:   "Denies problems  Psychiatric:  Denies problems  Endocrine:  Denies problems  Heme/Lymphatic:  Denies problems  Allergic/Immunologic:  Denies problems       Objective:         Vitals:    06/13/18 0823   BP: 100/68   Pulse: 60   Resp: 12   Temp: 98.1  F (36.7  C)   TempSrc: Oral   Weight: 169 lb 4 oz (76.8 kg)   Height: 5' 1.5\" (1.562 m)       Physical Exam:  General Appearance: Alert, cooperative, no distress, appears stated age   Head: Normocephalic, without obvious abnormality, atraumatic  Eyes: PERRL, conjunctiva/corneas clear, EOM's intact   Ears: Normal TM's and external ear canals, both ears  Nose:Nares normal, septum midline,mucosa normal, no drainage    Throat:Lips, mucosa, and tongue normal; teeth and gums normal  Neck: Supple, symmetrical, trachea midline, no adenopathy;  thyroid: not enlarged, symmetric, no tenderness/mass/nodules  Back: Symmetric, no curvature, ROM normal,  Lungs: Clear to auscultation bilaterally, respirations unlabored  Breasts: No breast masses, tenderness, asymmetry, or nipple discharge.  Heart: Regular rate and rhythm, S1 and S2 normal, no murmur, rub, or gallop  Abdomen: Soft, non-tender, bowel sounds active all four quadrants,  no masses, no organomegaly  Extremities: Extremities normal, atraumatic, no cyanosis or edema  Skin: Skin color, texture, turgor normal, no rashes or lesions  Lymph nodes: Cervical, supraclavicular, and axillary nodes normal and   Neurologic: Normal       "

## 2021-06-18 NOTE — PROGRESS NOTES
Procedure note for shoulder injection:    Verbal consent was obtained for a cortisone shoulder injection in the patient's right shoulder.  Patient was seated on the examination table with her hand on her lap in a supinated position.  After cleansing with an iodine swab using the posterior lateral technique 1 cc of 40 mg/mL Depo-Medrol combined with 4 cc of 1% lidocaine was injected without incident.  Patient tolerated the procedure well although did get slightly lightheaded towards the end.  Injection was completed and then patient was able to lay on the examination table for 30 seconds and her lightheadedness pass quickly.

## 2021-06-22 NOTE — PROGRESS NOTES
Patient is a few weeks past the 6 month debbie for the 2nd shot. Per Dr. Rosas it's okay to give. An IM injection was given in clinic today and tolerated well.

## 2021-06-22 NOTE — TELEPHONE ENCOUNTER
Who is calling:  Patient  Reason for Call:  Patient called to schedule her 2nd dose of the Shingrix. Patient has missed the 2-6 month period for the second shot. First shot was given on 6/13/18. Please advise on what patient should do. Please let patient know if she can schedule the Shingrix vaccine.  Date of last appointment with primary care: 6/13/18  Has the patient been recently seen:  No  Okay to leave a detailed message: Yes  [phone]

## 2021-06-24 NOTE — TELEPHONE ENCOUNTER
RN cannot approve Refill Request    RN can NOT refill this medication overdue for office visits and/or labs.    Aneesh Sorenson, [facility] Triage/Med Refill 2/12/2019    Requested Prescriptions   Pending Prescriptions Disp Refills     valACYclovir (VALTREX) 1000 MG tablet [Pharmacy Med Name: VALACYCLOVIR 1GM TABLETS] 20 tablet 0     Sig: TAKE 2 TABLETS BY MOUTH AT THE ONSET OF THE COLD SORE AND 1 TABLET 4 HOURS LATER. CAN REPEAT IF NEEDED FOR 3 DAYS    Antivirals Refill Protocol Failed - 2/12/2019  4:33 PM       Failed - Renal function done in last year    Creatinine   Date Value Ref Range Status   06/28/2017 0.87 0.60 - 1.10 mg/dL Final            Passed - Visit with PCP or prescribing provider visit in past 12 months or next 3 months    Last office visit with prescriber/PCP: 6/28/2017 Ema Hart MD OR same dept: Visit date not found OR same specialty: 6/28/2017 Ema Hart MD  Last physical: 6/13/2018 Last MTM visit: Visit date not found   Next visit within 3 mo: Visit date not found  Next physical within 3 mo: Visit date not found  Prescriber OR PCP: Ema Hart MD  Last diagnosis associated with med order: 1. Recurrent cold sores  - valACYclovir (VALTREX) 1000 MG tablet [Pharmacy Med Name: VALACYCLOVIR 1GM TABLETS]; TAKE 2 TABLETS BY MOUTH AT THE ONSET OF THE COLD SORE AND 1 TABLET 4 HOURS LATER. CAN REPEAT IF NEEDED FOR 3 DAYS  Dispense: 20 tablet; Refill: 0    If protocol passes may refill for 12 months if within 3 months of last provider visit (or a total of 15 months).

## 2021-06-30 NOTE — PROGRESS NOTES
"Progress Notes by Daya Peñaloza RN at 5/18/2021 10:30 AM     Author: Daya Peñaloza RN Service: -- Author Type: Registered Nurse    Filed: 5/18/2021 11:23 AM Encounter Date: 5/18/2021 Status: Signed    : Daya Peñaloza RN (Registered Nurse)         Garmin Screening Note     Purpose: Electrocardiogram Clinical Validation Study       Yana Valdez a 65 y.o. female , was seen at [facility] today to discuss participation in the Garmin study.   The consent form was reviewed on 05/18/21.     The review of the study included:    Study purpose     Conflict of interest    Device description      Study visit(s)    Risks of participation    Benefits (if any)    Alternatives    Voluntary participation    Confidentiality     Compensation/costs of participation    Study stipends    Injury and legal rights    The subject was provided time to review the consent form and consider participation. her questions were answered to her satisfaction.   The patient has voluntarily agreed to participate in the above noted study.     The consent form and HIPPA was IRB approved on 23APR21 and was signed 05/18/21 at 1026.     The subject was provided with a copy of the consent form and HIPAA. A copy of the signed forms was forwarded to medical records.    No study procedures were done prior to Yana Valdez providing informed consent.       Note time seated: 1018     Vitals  (TPBP)     Vitals:    05/18/21 1027   BP: 117/64   Patient Site: Left Arm   Patient Position: Sitting   Cuff Size: Adult Large   Pulse: 69   Resp: 13   Temp: 98.2  F (36.8  C)   TempSrc: Oral   SpO2: 95%   Weight: 173 lb 11.2 oz (78.8 kg)   Height: 5' 2\" (1.575 m)       Body mass index is 31.77 kg/m .  1955  65 y.o.    Past Medical History:   Diagnosis Date   ? Knee osteoarthritis         Current Outpatient Medications:   ?  ascorbic acid, vitamin C, (VITAMIN C) 1000 MG tablet, Take 1 tablet by mouth., Disp: , Rfl:   ?  calcium " carbonate-vitamin D3 (CALTRATE 600 PLUS D3) 600 mg(1,500mg) -400 unit per tablet, Take 1 tablet by mouth., Disp: , Rfl:   ?  valACYclovir (VALTREX) 1000 MG tablet, TAKE 2 TABLETS BY MOUTH AT THE ONSET OF THE COLD SORE AND 1 TABLET 4 HOURS LATER. CAN REPEAT IF NEEDED FOR 3 DAYS, Disp: 20 tablet, Rfl: 11  No Known Allergies    Women of Child Bearing Potential:   []Pregnant  []Not Pregnant  [x]N/A    General Questions:     Yes No   or ?     [] [x]    Race: [] or     []   []Black or     [] or     [] or Other     [x]White     Gender:[x]Female      []Male     Lifestyle Habits?     Smoking/ Tobacco History   [x]Do not smoke or use tobacco products   []Smoke or use tobacco products 0-5 times per week  []Smoke or use tobacco products 6-12 times per week  []Smoke or use tobacco products 13 or greater times per week     Alcohol Use Average   [x]Do not consume alcohol  []Consume 0-1 beverages per day   []Consume 2 beverages per day   []Consume 3 beverages per day   []Consume 4 or more beverages per day     Recreational Drug Use   [x]Have not used recreational drug(s) in the last year   []Used recreational drug(s) in the last week   []Used recreational drug(s) in the last month   []Used recreational drug(s) in the last year     Caffeine Intake:  []Consume ? 1 cups per week of drinks with caffeine   []Consume 2-3 cups per week of drinks with caffeine   []Consume 5-6 cups per week of drinks with caffeine   []Consumes 1 cup per day of drinks with caffeine   []Consumes 2-3 cups per days of drinks with caffeine   [x]Consumes 4 or more cups per day of drinks with caffeine     Exercise Habits:   []Exercise 0-29 minutes per week   []Exercise 30-59 minutes per week   []Exercise 1-2 hours per week   []Exercise 2-4 hours per week   [x]Exercise more than 4 hours per week     Wrist Hairness:  [x]No hair on wrist   []Sparse hair on  wrist   []Entire wrist covered with light growth   []Extensive hair growth on wrist   []Not measured       Preferred Wrist to wear DCD on: [x]  left   []  right  Wrist circumference:      140   mm  Device wearing wrist skin fold thickness:       4.3  mm    Device Information:  Device Unique identifier (XX-XXX):   Number of practice trials? 2  Date ECG Obtained? 05/18/21    Subject has now completed their participation in the Garmin study.     Daya Peñaloza RN

## 2021-06-30 NOTE — PROGRESS NOTES
Progress Notes by Allison Lara PA-C at 5/18/2021 10:30 AM     Author: Allison Lara PA-C Service: -- Author Type: Physician Assistant    Filed: 5/18/2021 11:11 AM Encounter Date: 5/18/2021 Status: Signed    : Allison Lara PA-C (Physician Assistant)           Nas Study     Physical Examination  For abnormal findings, please evaluate if the finding is Clinically Significant (by 'CS') or Not Clinically Significant (by 'NCS')  General Appearance  Normal  Head and Neck   Normal  Eyes     Normal  Ears, Nose, Mouth, Throat  Normal  Lungs     Normal  Cardiovascular   Normal  Abdomen    Normal  Musculoskeletal/Extremities Normal   Lymph Nodes   Normal  Skin     Normal        Neurological    Normal   Tremor absent     If present, document.     WILDA Negron PA-C

## 2021-07-19 ENCOUNTER — NURSE TRIAGE (OUTPATIENT)
Dept: FAMILY MEDICINE | Facility: CLINIC | Age: 66
End: 2021-07-19

## 2021-07-19 NOTE — TELEPHONE ENCOUNTER
Reason for call:  Patient reporting a symptom    Symptom or request: Pt states that she can feel lumps on her thyroid/In her neck - No breathing issues.  Please call patient and advise.      Duration (how long have symptoms been present): ongoing    Have you been treated for this before? Yes    Additional comments:     Phone Number patient can be reached at:  Home number on file [phone] (home)    Best Time:  any    Can we leave a detailed message on this number:  YES    Call taken on 7/19/2021 at 3:00 PM by Ema Che

## 2021-07-19 NOTE — TELEPHONE ENCOUNTER
S-(situation): Calling back about front of throat/thyroid area    B-(background): Last OV with Dr. Hart 11/4/2020.  Had US done 11/4/2020 and impression was: No significant change of thyroid nodules.    A-(assessment):   The past week she has noticed this and her daughter says she can see it, so she is concerned. She can feel one nodule on the her front of her throat, but she never could before.   She is not having a cough anymore, she was told it was acid reflux and is being treated for that.   There is no pain, not hard to breathe.  No fever, area is not tender to touch.   Area is about as big as her thumbnail/roughly dime sized/ or small marble.    R-(recommendations): Routing to provider,   Would assume you want to see her in clinic?  Pt okay with return call from BRAXTON Preston., 7/20/21    Hayley WILLIAM RN, BSN

## 2021-07-20 NOTE — TELEPHONE ENCOUNTER
Call placed to patient.  Relayed recommendation for clinic visit to assess thyroid concerns,changes.  Scheduled visit for 8/2/21 with PCP.  Offered visit with other provider in clinic that could see patient this week, patient declined.  Kailyn Henderson RN

## 2021-08-06 ENCOUNTER — OFFICE VISIT (OUTPATIENT)
Dept: FAMILY MEDICINE | Facility: CLINIC | Age: 66
End: 2021-08-06
Payer: MEDICARE

## 2021-08-06 VITALS
TEMPERATURE: 97.6 F | RESPIRATION RATE: 12 BRPM | HEIGHT: 62 IN | DIASTOLIC BLOOD PRESSURE: 80 MMHG | SYSTOLIC BLOOD PRESSURE: 128 MMHG | WEIGHT: 174.25 LBS | BODY MASS INDEX: 32.07 KG/M2 | HEART RATE: 68 BPM

## 2021-08-06 DIAGNOSIS — B00.1 RECURRENT COLD SORES: ICD-10-CM

## 2021-08-06 DIAGNOSIS — R93.89 ABNORMAL ULTRASOUND OF THYROID GLAND: Primary | ICD-10-CM

## 2021-08-06 LAB — TSH SERPL DL<=0.005 MIU/L-ACNC: 1.06 MU/L (ref 0.4–4)

## 2021-08-06 PROCEDURE — 36415 COLL VENOUS BLD VENIPUNCTURE: CPT | Performed by: FAMILY MEDICINE

## 2021-08-06 PROCEDURE — 84443 ASSAY THYROID STIM HORMONE: CPT | Performed by: FAMILY MEDICINE

## 2021-08-06 PROCEDURE — 99213 OFFICE O/P EST LOW 20 MIN: CPT | Performed by: FAMILY MEDICINE

## 2021-08-06 RX ORDER — VALACYCLOVIR HYDROCHLORIDE 1 G/1
TABLET, FILM COATED ORAL
Qty: 20 TABLET | Refills: 11 | Status: SHIPPED | OUTPATIENT
Start: 2021-08-06 | End: 2022-09-01

## 2021-08-06 ASSESSMENT — MIFFLIN-ST. JEOR: SCORE: 1288.64

## 2021-08-06 NOTE — PROGRESS NOTES
Assessment/Plan:    Yana Valdez is a 65 year old female presenting for:    Recurrent cold sores  refill  - valACYclovir (VALTREX) 1000 mg tablet  Dispense: 20 tablet; Refill: 11    Abnormal ultrasound of thyroid gland  Reviewed her thyroid ultrasound last year. Repeat thyroid ultrasound done today. If she has had growth of the nodules would recommend that she see a either a general surgeon or an endocrinologist for potential thyroidectomy.  - US Thyroid  - TSH with free T4 reflex  - TSH with free T4 reflex    Of note, spent significant time today discussing COVID-19 vaccine and the importance of this especially given her age. She declines at this time. Discussed continued masking and social distancing.    Medications Discontinued During This Encounter   Medication Reason     valACYclovir (VALTREX) 1000 MG tablet Reorder           Chief Complaint:  Thyroid Problem        Subjective:   Yana Valdez is a pleasant 65-year-old female presenting to the clinic today for concerns over thyroid nodules. Patient has a past medical history significant for thyroid nodules. She has had fine-needle aspiration of 2 of these nodules in the past which showed benign results.    She gets yearly thyroid ultrasounds.    She notes on the left side she has been having more swelling which she can feel on her thyroid. She does not have any compressive symptoms intraorally such as difficulty swallowing or breathing. No overt symptoms of hypo or hyperthyroidism    12 point review of systems completed and negative except for what has been described above    History   Smoking Status     Never Smoker   Smokeless Tobacco     Never Used         Current Outpatient Medications:      ascorbic acid, vitamin C, (VITAMIN C) 1000 MG tablet, [ASCORBIC ACID, VITAMIN C, (VITAMIN C) 1000 MG TABLET] Take 1 tablet by mouth., Disp: , Rfl:      calcium carbonate-vitamin D3 (CALTRATE 600 PLUS D3) 600 mg(1,500mg) -400 unit per tablet, [CALCIUM  "CARBONATE-VITAMIN D3 (CALTRATE 600 PLUS D3) 600 MG(1,500MG) -400 UNIT PER TABLET] Take 1 tablet by mouth., Disp: , Rfl:      valACYclovir (VALTREX) 1000 mg tablet, [VALACYCLOVIR (VALTREX) 1000 MG TABLET] TAKE 2 TABLETS BY MOUTH AT THE ONSET OF THE COLD SORE AND 1 TABLET 4 HOURS LATER. CAN REPEAT IF NEEDED FOR 3 DAYS, Disp: 20 tablet, Rfl: 11      Objective:  Vitals:    08/06/21 0936   BP: 128/80   Pulse: 68   Resp: 12   Temp: 97.6  F (36.4  C)   TempSrc: Tympanic   Weight: 79 kg (174 lb 4 oz)   Height: 1.575 m (5' 2\")       Body mass index is 31.87 kg/m .    Vital signs reviewed and stable  General: No acute distress  Psych: Appropriate affect  HEENT: moist mucous membranes, pupils equal, round, reactive to light and accomodation, tympanic membranes are pearly grey bilaterally  Lymph: no cervical or supraclavicular lymphadenopathy, prominence of thyroid bilaterally more so on the left with palpable nodules noted  Cardiovascular: regular rate and rhythm with no murmur  Pulmonary: clear to auscultation bilaterally with no wheeze  Extremities: warm and well perfused with no edema  Skin: warm and dry with no rash         This note has been dictated and transcribed using voice recognition software.   Any errors in transcription are unintentional and inherent to the software.  "

## 2021-08-20 ENCOUNTER — HOSPITAL ENCOUNTER (OUTPATIENT)
Dept: ULTRASOUND IMAGING | Facility: HOSPITAL | Age: 66
Discharge: HOME | End: 2021-08-20
Attending: FAMILY MEDICINE | Admitting: FAMILY MEDICINE
Payer: MEDICARE

## 2021-08-20 DIAGNOSIS — R93.89 ABNORMAL ULTRASOUND OF THYROID GLAND: ICD-10-CM

## 2021-08-20 PROCEDURE — 76536 US EXAM OF HEAD AND NECK: CPT

## 2021-09-12 ENCOUNTER — HEALTH MAINTENANCE LETTER (OUTPATIENT)
Age: 66
End: 2021-09-12

## 2021-10-15 ENCOUNTER — OFFICE VISIT (OUTPATIENT)
Dept: ENDOCRINOLOGY | Facility: CLINIC | Age: 66
End: 2021-10-15
Attending: FAMILY MEDICINE
Payer: MEDICARE

## 2021-10-15 VITALS
HEART RATE: 64 BPM | SYSTOLIC BLOOD PRESSURE: 130 MMHG | WEIGHT: 174 LBS | DIASTOLIC BLOOD PRESSURE: 80 MMHG | BODY MASS INDEX: 31.83 KG/M2

## 2021-10-15 DIAGNOSIS — E04.2 NON-TOXIC MULTINODULAR GOITER: Primary | ICD-10-CM

## 2021-10-15 DIAGNOSIS — K21.00 GASTROESOPHAGEAL REFLUX DISEASE WITH ESOPHAGITIS WITHOUT HEMORRHAGE: ICD-10-CM

## 2021-10-15 DIAGNOSIS — R49.0 HOARSENESS: ICD-10-CM

## 2021-10-15 DIAGNOSIS — R13.10 DYSPHAGIA, UNSPECIFIED TYPE: ICD-10-CM

## 2021-10-15 DIAGNOSIS — Z11.59 ENCOUNTER FOR SCREENING FOR OTHER VIRAL DISEASES: ICD-10-CM

## 2021-10-15 PROCEDURE — 99205 OFFICE O/P NEW HI 60 MIN: CPT | Performed by: INTERNAL MEDICINE

## 2021-10-15 RX ORDER — ASPIRIN 81 MG/1
1 TABLET, CHEWABLE ORAL EVERY 24 HOURS
COMMUNITY
Start: 2021-06-04

## 2021-10-15 RX ORDER — ENZYMES,DIGESTIVE
1 CAPSULE ORAL EVERY 6 HOURS
COMMUNITY
Start: 2021-06-04 | End: 2024-07-26

## 2021-10-15 RX ORDER — MULTIVITAMIN WITH IRON
1 TABLET ORAL DAILY
COMMUNITY

## 2021-10-15 NOTE — LETTER
10/15/2021         RE: Yana Valdez  [address] [place] [address]        Dear Colleague,    Thank you for referring your patient, Yana Valdez, to the [facility]. Please see a copy of my visit note below.    Subjective:    New patient    Yana Valdez is a 66 year old female who presents to review thyroid nodules.    She was incidentally found to have thyroid nodules in 2017. This is what prompted the serial US and subsequent FNAs.    Occasional hoarseness she attributes to GERD. Occasional dysphagia that is mild. No SOB.    No FH of thyroid cancer. Brother had a thyroidectomy for a benign goiter. 1 sister had a lobectomy for unknown reasons. Another sister also had a lobectomy for a nodule that was benign.     No prior H/N radiation.     No prior thyroid dysfunction. No prior use of thyroid hormone. No use of biotin.     Thyroid US 8/20/2021: right lobe 35 g, left lobe 3 g, overall there are 4 thyroid nodules >1 cm in maximal dimension, 2 of which are in the right lobe and 1 is in the left lobe and 1 is in the isthmus; I agree with the radiology interpretation of these nodules and reviewed the images myself in detail   -right superior 3 cm in maximal dimension nodule: solid, isoechoic, without other suspicious features, very slightly increased in size compare to US in 2020, recommend FNA   -right inferior 3.6 cm in maximal dimension nodule: solid, isoechoic, without other suspicious features, stable in size compared to US 2020, recommend FNA   -left mid 1.4 cm in maximal dimension nodule: solid, hypoechoic, macrocalcification, stable in size compared to US 2020, recommend FNA   -isthmus 1.7 cm in maximal dimension nodule: cystic with some internal debris and likely some small peripheral solid components, no other suspicious features, has increased in size compared to prior US in 2020, does not meet criteria for FNA based on TIRADS criteria    -No LAD but  lateral neck was not examined     12/8/2017: FNA right lower thyroid nodule and per report this was the nodule with prior atypical cells          7/6/2017: Right thyroid nodule (unspecified which one) had ThyraMIR ThyGenX genetic testing with assessment that the nodule is likely benign (94% per the report) with no mutations detected     6/29/2017: FNA inferior right thyroid lobe nodule and 1.4 cm left lower lobe nodule         8/2021: TSH 1.06    Objective:    BMI 31.83 kg/m2. No thyroid eye disease. Right thyroid lobe is significantly enlarged and easily visible and mobile with swallow, ~40 g without discrete nodularity apprecaited. No left lobe nodularity appreciated. No cervical LAD.     Assessment/Plan:    # Multinodular goiter    We reviewed her prior ultrasounds and FNA results in detail.  Given the size and imaging characteristics of the nodules and the prior FNA results being reported not using the [place] criteria and indeterminate I do recommend FNA of 3 thyroid nodules at this time.    -right superior 3 cm in maximal dimension nodule  -right inferior 3.6 cm in maximal dimension nodule  -left mid 1.4 cm in maximal dimension nodule    Dysphagia is not bothersome enough to warrant surgical or RFA treatment.     If FNA returns benign, as expected, we will follow with neck US in 1 year (orders not placed yet).  We reviewed that if she develops worsening compressive symptoms in the interim she will let me know.  We will also check a TSH yearly and she has remained biochemically euthyroid.    60 minutes spent on the date of the encounter doing chart review, history and exam, documentation and further activities as noted above.       Again, thank you for allowing me to participate in the care of your patient.        Sincerely,        Nikolay Johnson MD

## 2021-10-15 NOTE — PROGRESS NOTES
Subjective:    New patient    Yana Valdez is a 66 year old female who presents to review thyroid nodules.    She was incidentally found to have thyroid nodules in 2017. This is what prompted the serial US and subsequent FNAs.    Occasional hoarseness she attributes to GERD. Occasional dysphagia that is mild. No SOB.    No FH of thyroid cancer. Brother had a thyroidectomy for a benign goiter. 1 sister had a lobectomy for unknown reasons. Another sister also had a lobectomy for a nodule that was benign.     No prior H/N radiation.     No prior thyroid dysfunction. No prior use of thyroid hormone. No use of biotin.     Thyroid US 8/20/2021: right lobe 35 g, left lobe 3 g, overall there are 4 thyroid nodules >1 cm in maximal dimension, 2 of which are in the right lobe and 1 is in the left lobe and 1 is in the isthmus; I agree with the radiology interpretation of these nodules and reviewed the images myself in detail   -right superior 3 cm in maximal dimension nodule: solid, isoechoic, without other suspicious features, very slightly increased in size compare to US in 2020, recommend FNA   -right inferior 3.6 cm in maximal dimension nodule: solid, isoechoic, without other suspicious features, stable in size compared to US 2020, recommend FNA   -left mid 1.4 cm in maximal dimension nodule: solid, hypoechoic, macrocalcification, stable in size compared to US 2020, recommend FNA   -isthmus 1.7 cm in maximal dimension nodule: cystic with some internal debris and likely some small peripheral solid components, no other suspicious features, has increased in size compared to prior US in 2020, does not meet criteria for FNA based on TIRADS criteria    -No LAD but lateral neck was not examined     12/8/2017: FNA right lower thyroid nodule and per report this was the nodule with prior atypical cells          7/6/2017: Right thyroid nodule (unspecified which one) had ThyraMIR ThyGenX genetic testing with assessment that the  nodule is likely benign (94% per the report) with no mutations detected     6/29/2017: FNA inferior right thyroid lobe nodule and 1.4 cm left lower lobe nodule         8/2021: TSH 1.06    Objective:    BMI 31.83 kg/m2. No thyroid eye disease. Right thyroid lobe is significantly enlarged and easily visible and mobile with swallow, ~40 g without discrete nodularity apprecaited. No left lobe nodularity appreciated. No cervical LAD.     Assessment/Plan:    # Multinodular goiter    We reviewed her prior ultrasounds and FNA results in detail.  Given the size and imaging characteristics of the nodules and the prior FNA results being reported not using the [place] criteria and indeterminate I do recommend FNA of 3 thyroid nodules at this time.    -right superior 3 cm in maximal dimension nodule  -right inferior 3.6 cm in maximal dimension nodule  -left mid 1.4 cm in maximal dimension nodule    Dysphagia is not bothersome enough to warrant surgical or RFA treatment.     If FNA returns benign, as expected, we will follow with neck US in 1 year (orders not placed yet).  We reviewed that if she develops worsening compressive symptoms in the interim she will let me know.  We will also check a TSH yearly and she has remained biochemically euthyroid.    60 minutes spent on the date of the encounter doing chart review, history and exam, documentation and further activities as noted above.

## 2021-10-26 ENCOUNTER — LAB (OUTPATIENT)
Dept: LAB | Facility: CLINIC | Age: 66
End: 2021-10-26
Attending: INTERNAL MEDICINE
Payer: MEDICARE

## 2021-10-26 DIAGNOSIS — Z11.59 ENCOUNTER FOR SCREENING FOR OTHER VIRAL DISEASES: ICD-10-CM

## 2021-10-26 PROCEDURE — U0003 INFECTIOUS AGENT DETECTION BY NUCLEIC ACID (DNA OR RNA); SEVERE ACUTE RESPIRATORY SYNDROME CORONAVIRUS 2 (SARS-COV-2) (CORONAVIRUS DISEASE [COVID-19]), AMPLIFIED PROBE TECHNIQUE, MAKING USE OF HIGH THROUGHPUT TECHNOLOGIES AS DESCRIBED BY CMS-2020-01-R: HCPCS

## 2021-10-26 PROCEDURE — U0005 INFEC AGEN DETEC AMPLI PROBE: HCPCS

## 2021-10-27 LAB — SARS-COV-2 RNA RESP QL NAA+PROBE: NEGATIVE

## 2021-10-28 ENCOUNTER — ANCILLARY PROCEDURE (OUTPATIENT)
Dept: ULTRASOUND IMAGING | Facility: CLINIC | Age: 66
End: 2021-10-28
Attending: INTERNAL MEDICINE
Payer: MEDICARE

## 2021-10-28 DIAGNOSIS — E04.2 NON-TOXIC MULTINODULAR GOITER: ICD-10-CM

## 2021-10-28 PROCEDURE — 99207 US BIOPSY THYROID FINE NEEDLE ASPIRATION: CPT | Performed by: RADIOLOGY

## 2021-10-28 PROCEDURE — 88172 CYTP DX EVAL FNA 1ST EA SITE: CPT | Mod: 26 | Performed by: PATHOLOGY

## 2021-10-28 PROCEDURE — 88173 CYTOPATH EVAL FNA REPORT: CPT | Mod: 26 | Performed by: PATHOLOGY

## 2021-10-28 PROCEDURE — 88173 CYTOPATH EVAL FNA REPORT: CPT | Mod: TC,91 | Performed by: INTERNAL MEDICINE

## 2021-10-28 RX ORDER — LIDOCAINE HYDROCHLORIDE 10 MG/ML
5 INJECTION, SOLUTION INFILTRATION; PERINEURAL ONCE
Status: COMPLETED | OUTPATIENT
Start: 2021-10-28 | End: 2021-10-28

## 2021-10-28 RX ADMIN — LIDOCAINE HYDROCHLORIDE 3 ML: 10 INJECTION, SOLUTION INFILTRATION; PERINEURAL at 10:18

## 2021-11-01 LAB
PATH REPORT.COMMENTS IMP SPEC: ABNORMAL
PATH REPORT.COMMENTS IMP SPEC: ABNORMAL
PATH REPORT.COMMENTS IMP SPEC: YES
PATH REPORT.COMMENTS IMP SPEC: YES
PATH REPORT.FINAL DX SPEC: ABNORMAL
PATH REPORT.GROSS SPEC: ABNORMAL
PATH REPORT.RELEVANT HX SPEC: ABNORMAL

## 2021-11-02 ENCOUNTER — TELEPHONE (OUTPATIENT)
Dept: ENDOCRINOLOGY | Facility: CLINIC | Age: 66
End: 2021-11-02

## 2021-11-02 NOTE — RESULT ENCOUNTER NOTE
"Can Gaby please be called and informed that the left thyroid lobe nodule was benign, as expected. The 2 right lobe nodules returned as \"atypical\", this doesn't mean they are cancers, it just means that with the limitations of biopsy we cannot conclude that these nodules are benign at this time. In general the risk of these nodules being benign with this \"atypical\" result is around 70-90%. Options include repeat biopsy in a few weeks, sending for genetic testing, and observation with repeat ultrasound. I would like to set up a follow up appointment to review options with Gaby. Thanks! "

## 2021-11-02 NOTE — TELEPHONE ENCOUNTER
"----- Message from Nikolay Johnson MD sent at 11/1/2021  9:17 PM CDT -----  Can Gaby please be called and informed that the left thyroid lobe nodule was benign, as expected. The 2 right lobe nodules returned as \"atypical\", this doesn't mean they are cancers, it just means that with the limitations of biopsy we cannot conclude that these nodules are benign at this time. In general the risk of these nodules being benign with this \"atypical\" result is around 70-90%. Options include repeat biopsy in a few weeks, sending for genetic testing, and observation with repeat ultrasound. I would like to set up a follow up appointment to review options with Gaby. Thanks!   "

## 2021-11-02 NOTE — TELEPHONE ENCOUNTER
Called and relayed the message below to the patient. No further concerns or questions. Next appointment with Dr. Johnson is on 11/9/21 at 9am.

## 2021-11-09 ENCOUNTER — OFFICE VISIT (OUTPATIENT)
Dept: ENDOCRINOLOGY | Facility: CLINIC | Age: 66
End: 2021-11-09
Payer: MEDICARE

## 2021-11-09 VITALS
WEIGHT: 172 LBS | BODY MASS INDEX: 31.46 KG/M2 | DIASTOLIC BLOOD PRESSURE: 70 MMHG | HEART RATE: 60 BPM | SYSTOLIC BLOOD PRESSURE: 114 MMHG

## 2021-11-09 DIAGNOSIS — R13.10 DYSPHAGIA, UNSPECIFIED TYPE: ICD-10-CM

## 2021-11-09 DIAGNOSIS — K21.00 GASTROESOPHAGEAL REFLUX DISEASE WITH ESOPHAGITIS WITHOUT HEMORRHAGE: ICD-10-CM

## 2021-11-09 DIAGNOSIS — E04.2 NON-TOXIC MULTINODULAR GOITER: Primary | ICD-10-CM

## 2021-11-09 DIAGNOSIS — R49.0 HOARSENESS: ICD-10-CM

## 2021-11-09 PROCEDURE — 99214 OFFICE O/P EST MOD 30 MIN: CPT | Performed by: INTERNAL MEDICINE

## 2021-11-09 NOTE — PROGRESS NOTES
Subjective:    Established patient    Gaby returns today to discuss results following fine-needle aspiration.    10/28/2021: Left thyroid lobe nodule FNA was benign and both right lobe nodules had FNA return as AUS.     Objective:    Appears well.  See my prior note for comprehensive exam.    Assessment/Plan:    # Multinodular goiter    We reviewed the results of her fine-needle aspiration in detail.  Both of the right-sided thyroid nodules returned as atypia of undetermined significance.  We discussed that the risk of malignancy with this cytology finding is approximately 10-30%.  We discussed management including repeat FNA, genetic testing, and diagnostic lobectomy.  She has a strong preference to avoid surgery which is understandable.  I will start by reviewing with the pathologist to see if we can get a second opinion and second read of the slides.  I will also see if we can send for genetic testing from her recent FNA.    We discussed that if we are unable to send for genetic testing and if a second opinion from the pathologist again comes back with AUS that she prefers to repeat FNA in a few months with genetic testing.  I will order this if needed and we will go from there.    We also reviewed that she does have mild dysphagia but this is very mild and she would not want any procedural intervention even if we were able to prove that this was related to her thyroid nodularity.  Therefore we will not proceed with esophagram.    33 minutes spent on the date of the encounter doing chart review, history and exam, documentation and further activities as noted above.

## 2021-11-09 NOTE — LETTER
11/9/2021         RE: Yana Valdez  [address] [place] [address]        Dear Colleague,    Thank you for referring your patient, Yana Valdez, to the [facility]. Please see a copy of my visit note below.    Subjective:    Established patient    Gaby returns today to discuss results following fine-needle aspiration.    10/28/2021: Left thyroid lobe nodule FNA was benign and both right lobe nodules had FNA return as AUS.     Objective:    Appears well.  See my prior note for comprehensive exam.    Assessment/Plan:    # Multinodular goiter    We reviewed the results of her fine-needle aspiration in detail.  Both of the right-sided thyroid nodules returned as atypia of undetermined significance.  We discussed that the risk of malignancy with this cytology finding is approximately 10-30%.  We discussed management including repeat FNA, genetic testing, and diagnostic lobectomy.  She has a strong preference to avoid surgery which is understandable.  I will start by reviewing with the pathologist to see if we can get a second opinion and second read of the slides.  I will also see if we can send for genetic testing from her recent FNA.    We discussed that if we are unable to send for genetic testing and if a second opinion from the pathologist again comes back with AUS that she prefers to repeat FNA in a few months with genetic testing.  I will order this if needed and we will go from there.    We also reviewed that she does have mild dysphagia but this is very mild and she would not want any procedural intervention even if we were able to prove that this was related to her thyroid nodularity.  Therefore we will not proceed with esophagram.    33 minutes spent on the date of the encounter doing chart review, history and exam, documentation and further activities as noted above.       Again, thank you for allowing me to participate in the care of your patient.         Sincerely,        Nikolay Johnson MD

## 2021-11-10 ENCOUNTER — DOCUMENTATION ONLY (OUTPATIENT)
Dept: ENDOCRINOLOGY | Facility: CLINIC | Age: 66
End: 2021-11-10
Payer: MEDICARE

## 2021-11-10 DIAGNOSIS — E04.2 NON-TOXIC MULTINODULAR GOITER: Primary | ICD-10-CM

## 2021-11-22 LAB
SCANNED LAB RESULT: NORMAL
SCANNED LAB RESULT: NORMAL

## 2021-12-08 DIAGNOSIS — E04.2 NON-TOXIC MULTINODULAR GOITER: Primary | ICD-10-CM

## 2021-12-08 NOTE — RESULT ENCOUNTER NOTE
[employer] message sent. Plan for TSH, neck US, return visit 8/2022 and she will let me know if she develops worsening compressive symptoms in the interim.

## 2022-01-02 ENCOUNTER — HEALTH MAINTENANCE LETTER (OUTPATIENT)
Age: 67
End: 2022-01-02

## 2022-09-01 ENCOUNTER — TELEPHONE (OUTPATIENT)
Dept: FAMILY MEDICINE | Facility: CLINIC | Age: 67
End: 2022-09-01

## 2022-09-01 DIAGNOSIS — B00.1 RECURRENT COLD SORES: ICD-10-CM

## 2022-09-01 RX ORDER — VALACYCLOVIR HYDROCHLORIDE 1 G/1
TABLET, FILM COATED ORAL
Qty: 20 TABLET | Refills: 0 | Status: SHIPPED | OUTPATIENT
Start: 2022-09-01 | End: 2022-10-31

## 2022-09-01 NOTE — TELEPHONE ENCOUNTER
Patient called requesting refill of valtrex.  Reviewed chart, last visit 8/6/21.  Patient has appointment scheduled 9/12/22.  Refill sent to preferred pharmacy.  Kailyn Henderson RN

## 2022-09-12 ENCOUNTER — OFFICE VISIT (OUTPATIENT)
Dept: FAMILY MEDICINE | Facility: CLINIC | Age: 67
End: 2022-09-12
Payer: MEDICARE

## 2022-09-12 VITALS
OXYGEN SATURATION: 97 % | TEMPERATURE: 98.3 F | SYSTOLIC BLOOD PRESSURE: 100 MMHG | WEIGHT: 164.13 LBS | DIASTOLIC BLOOD PRESSURE: 80 MMHG | RESPIRATION RATE: 12 BRPM | HEIGHT: 61 IN | HEART RATE: 63 BPM | BODY MASS INDEX: 30.99 KG/M2

## 2022-09-12 DIAGNOSIS — E04.2 NON-TOXIC MULTINODULAR GOITER: ICD-10-CM

## 2022-09-12 DIAGNOSIS — Z00.00 ENCOUNTER FOR MEDICARE ANNUAL WELLNESS EXAM: Primary | ICD-10-CM

## 2022-09-12 DIAGNOSIS — Z13.1 ENCOUNTER FOR SCREENING FOR DIABETES MELLITUS: ICD-10-CM

## 2022-09-12 DIAGNOSIS — Z11.59 NEED FOR HEPATITIS C SCREENING TEST: ICD-10-CM

## 2022-09-12 DIAGNOSIS — Z13.220 LIPID SCREENING: ICD-10-CM

## 2022-09-12 PROBLEM — D12.3 BENIGN NEOPLASM OF TRANSVERSE COLON: Status: ACTIVE | Noted: 2021-06-08

## 2022-09-12 PROBLEM — K63.5 POLYP OF COLON: Status: ACTIVE | Noted: 2021-06-04

## 2022-09-12 LAB
ALBUMIN SERPL BCG-MCNC: 4.4 G/DL (ref 3.5–5.2)
ALP SERPL-CCNC: 76 U/L (ref 35–104)
ALT SERPL W P-5'-P-CCNC: 24 U/L (ref 10–35)
ANION GAP SERPL CALCULATED.3IONS-SCNC: 6 MMOL/L (ref 7–15)
AST SERPL W P-5'-P-CCNC: 27 U/L (ref 10–35)
BILIRUB SERPL-MCNC: 0.6 MG/DL
BUN SERPL-MCNC: 10.3 MG/DL (ref 8–23)
CALCIUM SERPL-MCNC: 9.4 MG/DL (ref 8.8–10.2)
CHLORIDE SERPL-SCNC: 104 MMOL/L (ref 98–107)
CHOLEST SERPL-MCNC: 194 MG/DL
CREAT SERPL-MCNC: 0.87 MG/DL (ref 0.51–0.95)
DEPRECATED HCO3 PLAS-SCNC: 29 MMOL/L (ref 22–29)
GFR SERPL CREATININE-BSD FRML MDRD: 73 ML/MIN/1.73M2
GLUCOSE SERPL-MCNC: 96 MG/DL (ref 70–99)
HDLC SERPL-MCNC: 47 MG/DL
LDLC SERPL CALC-MCNC: 119 MG/DL
NONHDLC SERPL-MCNC: 147 MG/DL
POTASSIUM SERPL-SCNC: 4.5 MMOL/L (ref 3.4–5.3)
PROT SERPL-MCNC: 7 G/DL (ref 6.4–8.3)
SODIUM SERPL-SCNC: 139 MMOL/L (ref 136–145)
TRIGL SERPL-MCNC: 142 MG/DL
TSH SERPL DL<=0.005 MIU/L-ACNC: 0.6 UIU/ML (ref 0.3–4.2)

## 2022-09-12 PROCEDURE — G0009 ADMIN PNEUMOCOCCAL VACCINE: HCPCS | Performed by: FAMILY MEDICINE

## 2022-09-12 PROCEDURE — 80053 COMPREHEN METABOLIC PANEL: CPT | Performed by: FAMILY MEDICINE

## 2022-09-12 PROCEDURE — 90677 PCV20 VACCINE IM: CPT | Performed by: FAMILY MEDICINE

## 2022-09-12 PROCEDURE — G0439 PPPS, SUBSEQ VISIT: HCPCS | Performed by: FAMILY MEDICINE

## 2022-09-12 PROCEDURE — 36415 COLL VENOUS BLD VENIPUNCTURE: CPT | Performed by: FAMILY MEDICINE

## 2022-09-12 PROCEDURE — 80061 LIPID PANEL: CPT | Performed by: FAMILY MEDICINE

## 2022-09-12 PROCEDURE — 84443 ASSAY THYROID STIM HORMONE: CPT | Performed by: FAMILY MEDICINE

## 2022-09-12 ASSESSMENT — ENCOUNTER SYMPTOMS
HEADACHES: 0
MYALGIAS: 0
EYE PAIN: 0
CONSTIPATION: 0
PARESTHESIAS: 0
HEMATURIA: 0
NAUSEA: 0
SHORTNESS OF BREATH: 0
COUGH: 1
HEMATOCHEZIA: 0
ABDOMINAL PAIN: 0
DYSURIA: 0
JOINT SWELLING: 0
FEVER: 0
HEARTBURN: 0
DIZZINESS: 0
CHILLS: 0
DIARRHEA: 0
SORE THROAT: 0
ARTHRALGIAS: 0
WEAKNESS: 0
PALPITATIONS: 0
NERVOUS/ANXIOUS: 0
BREAST MASS: 0
FREQUENCY: 0

## 2022-09-12 ASSESSMENT — ACTIVITIES OF DAILY LIVING (ADL): CURRENT_FUNCTION: NO ASSISTANCE NEEDED

## 2022-09-12 NOTE — PROGRESS NOTES
"SUBJECTIVE:   Yana Valdez is a 67 year old female who presents for Preventive Visit.    Patient has been advised of split billing requirements and indicates understanding: Yes  Are you in the first 12 months of your Medicare coverage?  No    Healthy Habits:     In general, how would you rate your overall health?  Good    Frequency of exercise:  6-7 days/week    Duration of exercise:  45-60 minutes    Do you usually eat at least 4 servings of fruit and vegetables a day, include whole grains    & fiber and avoid regularly eating high fat or \"junk\" foods?  Yes    Taking medications regularly:  Yes    Ability to successfully perform activities of daily living:  No assistance needed    Home Safety:  No safety concerns identified    Hearing Impairment:  No hearing concerns    In the past 6 months, have you been bothered by leaking of urine?  No    In general, how would you rate your overall mental or emotional health?  Good      PHQ-2 Total Score: 0    Additional concerns today:  Yes    Do you feel safe in your environment? Yes    Have you ever done Advance Care Planning? (For example, a Health Directive, POLST, or a discussion with a medical provider or your loved ones about your wishes): No, advance care planning information given to patient to review.  Patient plans to discuss their wishes with loved ones or provider.      Fall risk  Fallen 2 or more times in the past year?: No  Any fall with injury in the past year?: No    Cognitive Screening   1) Repeat 3 items (Leader, Season, Table)    2) Clock draw: NORMAL  3) 3 item recall: Recalls 3 objects  Results: 3 items recalled: COGNITIVE IMPAIRMENT LESS LIKELY    Mini-CogTM Copyright GREGORY Dominguez. Licensed by the author for use in [facility]; reprinted with permission ([email or web address]). All rights reserved.      Do you have sleep apnea, excessive snoring or daytime drowsiness?: yes    Reviewed and updated as needed this visit by clinical staff   Tobacco  " Allergies  Meds                Reviewed and updated as needed this visit by Provider                   Social History     Tobacco Use     Smoking status: Never Smoker     Smokeless tobacco: Never Used   Substance Use Topics     Alcohol use: Yes     Comment: Alcoholic Drinks/day: occasionally         Alcohol Use 9/12/2022   Prescreen: >3 drinks/day or >7 drinks/week? 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  Appears as though they did some genetic testing as well as biopsy.  She was recommended to get a repeat ultrasound this fall with follow-up with endocrinology.  Information given to the patient.    Current providers sharing in care for this patient include:   Patient Care Team:  Ema Hart MD as PCP - General  Ema Hart MD as Assigned PCP  Nikolay Johnson MD as MD (Endocrinology, Diabetes, and Metabolism)  Nikolay Johnson MD as Assigned Endocrinology Provider    The following health maintenance items are reviewed in Epic and correct as of today:  Health Maintenance Due   Topic Date Due     COVID-19 Vaccine (1) Never done     HEPATITIS C SCREENING  Never done     Pneumococcal Vaccine: 65+ Years (2 - PCV) 11/06/2021     INFLUENZA VACCINE (1) 09/01/2022     Lab work is in process  Pneumonia Vaccine: Updated today  Mammogram Screening: Mammogram Screening: Recommended mammography every 1-2 years with patient discussion and risk factor consideration    FHS-7:   Breast CA Risk Assessment (FHS-7) 9/12/2022   Did any of your first-degree relatives have breast or ovarian cancer? Yes   Did any man in your family have breast cancer? No   Did any woman in your family have breast and ovarian cancer? No   Did any woman in your family have breast cancer before age 50 y? Yes   Do you have 2 or more relatives with breast and/or ovarian cancer? Yes   Do you have 2 or more relatives with breast and/or bowel cancer? Yes     click delete button to remove this  "line now      Review of Systems   Constitutional: Negative for chills and fever.   HENT: Negative for congestion, ear pain, hearing loss and sore throat.    Eyes: Negative for pain and visual disturbance.   Respiratory: Positive for cough. Negative for shortness of breath.    Cardiovascular: Negative for chest pain, palpitations and peripheral edema.   Gastrointestinal: Negative for abdominal pain, constipation, diarrhea, heartburn, hematochezia and nausea.   Breasts:  Negative for tenderness, breast mass and discharge.   Genitourinary: Negative for dysuria, frequency, genital sores, hematuria, pelvic pain, urgency, vaginal bleeding and vaginal discharge.   Musculoskeletal: Negative for arthralgias, joint swelling and myalgias.   Skin: Negative for rash.   Neurological: Negative for dizziness, weakness, headaches and paresthesias.   Psychiatric/Behavioral: Negative for mood changes. The patient is not nervous/anxious.        OBJECTIVE:   /80   Pulse 63   Temp 98.3  F (36.8  C) (Tympanic)   Resp 12   Ht 1.543 m (5' 0.75\")   Wt 74.4 kg (164 lb 2 oz)   SpO2 97%   Breastfeeding No   BMI 31.27 kg/m   Estimated body mass index is 31.27 kg/m  as calculated from the following:    Height as of this encounter: 1.543 m (5' 0.75\").    Weight as of this encounter: 74.4 kg (164 lb 2 oz).  Physical Exam    Physical Exam:  General Appearance: Alert, cooperative, no distress, appears stated age   Head: Normocephalic, without obvious abnormality, atraumatic  Eyes: PERRL, conjunctiva/corneas clear, EOM's intact   Ears: Normal TM's and external ear canals, both ears  Neck: Supple, symmetrical, trachea midline, no adenopathy;  thyroid: not enlarged, symmetric, no tenderness/mass/nodules  Back: Symmetric, no curvature, ROM normal,  Lungs: Clear to auscultation bilaterally, respirations unlabored  Breasts: No breast masses, tenderness, asymmetry, or nipple discharge.  Heart: Regular rate and rhythm, S1 and S2 normal, no " "murmur, rub, or gallop  Abdomen: Soft, non-tender, bowel sounds active all four quadrants,  no masses, no organomegaly  Extremities: Extremities normal, atraumatic, no cyanosis or edema  Skin: Skin color, texture, turgor normal, no rashes or lesions  Lymph nodes: Cervical, supraclavicular, and axillary nodes normal and   Neurologic: Normal      ASSESSMENT / PLAN:       ICD-10-CM    1. Encounter for Medicare annual wellness exam  Z00.00    2. Need for hepatitis C screening test  Z11.59    3. Lipid screening  Z13.220 Lipid panel reflex to direct LDL Non-fasting     COMPREHENSIVE METABOLIC PANEL     Lipid panel reflex to direct LDL Non-fasting   4. Encounter for screening for diabetes mellitus  Z13.1 COMPREHENSIVE METABOLIC PANEL   5. Non-toxic multinodular goiter  E04.2 TSH with free T4 reflex       Patient has been advised of split billing requirements and indicates understanding: Yes    COUNSELING:  Reviewed preventive health counseling, as reflected in patient instructions    Estimated body mass index is 31.27 kg/m  as calculated from the following:    Height as of this encounter: 1.543 m (5' 0.75\").    Weight as of this encounter: 74.4 kg (164 lb 2 oz).    Weight management plan: Discussed healthy diet and exercise guidelines    She reports that she has never smoked. She has never used smokeless tobacco.      Appropriate preventive services were discussed with this patient, including applicable screening as appropriate for cardiovascular disease, diabetes, osteopenia/osteoporosis, and glaucoma.  As appropriate for age/gender, discussed screening for colorectal cancer, prostate cancer, breast cancer, and cervical cancer. Checklist reviewing preventive services available has been given to the patient.    Reviewed patients plan of care and provided an AVS. The Basic Care Plan (routine screening as documented in Health Maintenance) for Yana meets the Care Plan requirement. This Care Plan has been established and " reviewed with the Patient.    Counseling Resources:  ATP IV Guidelines  Pooled Cohorts Equation Calculator  Breast Cancer Risk Calculator  Breast Cancer: Medication to Reduce Risk  FRAX Risk Assessment  ICSI Preventive Guidelines  Dietary Guidelines for Americans, 2010  USDA's MyPlate  ASA Prophylaxis  Lung CA Screening    Ema Hart MD  [facility] NATHANIEL    Identified Health Risks:

## 2022-09-12 NOTE — PATIENT INSTRUCTIONS
From endocrinology:    Rick Griffin,     Please call [phone] to schedule your neck ultrasound (which should be done at [facility] in [place]) and lab appointment (anywhere) to check your TSH in August of this year. After you schedule your neck ultrasound and lab appointment he will like you to schedule a follow up with him a week after and you can call the same number above for an appointment. Please let me know if you have any questions.      Sincerely,  Rhonda See Michael, MANOJ  [address] [address]  P: [phone]  F: [phone]          Patient Education   Personalized Prevention Plan  You are due for the preventive services outlined below.  Your care team is available to assist you in scheduling these services.  If you have already completed any of these items, please share that information with your care team to update in your medical record.  Health Maintenance Due   Topic Date Due    ANNUAL REVIEW OF HM ORDERS  Never done    COVID-19 Vaccine (1) Never done    Hepatitis C Screening  Never done    Pneumococcal Vaccine (2 - PCV) 11/06/2021    Flu Vaccine (1) 09/01/2022

## 2022-10-10 ENCOUNTER — ANCILLARY PROCEDURE (OUTPATIENT)
Dept: ULTRASOUND IMAGING | Facility: CLINIC | Age: 67
End: 2022-10-10
Attending: INTERNAL MEDICINE
Payer: MEDICARE

## 2022-10-10 DIAGNOSIS — E04.2 NON-TOXIC MULTINODULAR GOITER: ICD-10-CM

## 2022-10-10 PROCEDURE — 76536 US EXAM OF HEAD AND NECK: CPT | Mod: GC | Performed by: STUDENT IN AN ORGANIZED HEALTH CARE EDUCATION/TRAINING PROGRAM

## 2022-10-31 ENCOUNTER — MYC REFILL (OUTPATIENT)
Dept: FAMILY MEDICINE | Facility: CLINIC | Age: 67
End: 2022-10-31

## 2022-10-31 DIAGNOSIS — B00.1 RECURRENT COLD SORES: ICD-10-CM

## 2022-11-01 RX ORDER — VALACYCLOVIR HYDROCHLORIDE 1 G/1
TABLET, FILM COATED ORAL
Qty: 20 TABLET | Refills: 0 | Status: SHIPPED | OUTPATIENT
Start: 2022-11-01 | End: 2023-08-19

## 2022-11-18 ENCOUNTER — TELEPHONE (OUTPATIENT)
Dept: ENDOCRINOLOGY | Facility: CLINIC | Age: 67
End: 2022-11-18

## 2022-11-18 NOTE — TELEPHONE ENCOUNTER
Called and spoke with patient regarding scheduling a follow up visit with Dr. Johnson. Patient stated that she's currently out of town and will call the clinic to schedule when she gets back.

## 2022-11-19 ENCOUNTER — HEALTH MAINTENANCE LETTER (OUTPATIENT)
Age: 67
End: 2022-11-19

## 2022-11-29 ENCOUNTER — IMMUNIZATION (OUTPATIENT)
Dept: FAMILY MEDICINE | Facility: CLINIC | Age: 67
End: 2022-11-29
Payer: MEDICARE

## 2022-11-29 PROCEDURE — G0008 ADMIN INFLUENZA VIRUS VAC: HCPCS

## 2022-11-29 PROCEDURE — 90662 IIV NO PRSV INCREASED AG IM: CPT

## 2023-05-01 ENCOUNTER — ANCILLARY PROCEDURE (OUTPATIENT)
Dept: MAMMOGRAPHY | Facility: CLINIC | Age: 68
End: 2023-05-01
Attending: FAMILY MEDICINE
Payer: MEDICARE

## 2023-05-01 DIAGNOSIS — Z12.31 VISIT FOR SCREENING MAMMOGRAM: ICD-10-CM

## 2023-05-01 PROCEDURE — 77067 SCR MAMMO BI INCL CAD: CPT

## 2023-08-19 ENCOUNTER — MYC REFILL (OUTPATIENT)
Dept: FAMILY MEDICINE | Facility: CLINIC | Age: 68
End: 2023-08-19
Payer: MEDICARE

## 2023-08-19 DIAGNOSIS — B00.1 RECURRENT COLD SORES: ICD-10-CM

## 2023-08-21 RX ORDER — VALACYCLOVIR HYDROCHLORIDE 1 G/1
TABLET, FILM COATED ORAL
Qty: 20 TABLET | Refills: 0 | Status: SHIPPED | OUTPATIENT
Start: 2023-08-21 | End: 2023-09-18

## 2023-08-21 NOTE — TELEPHONE ENCOUNTER
"Prescription approved per [facility] Refill Protocol.       Requested Prescriptions   Signed Prescriptions Disp Refills    valACYclovir (VALTREX) 1000 mg tablet 20 tablet 0     Sig: [VALACYCLOVIR (VALTREX) 1000 MG TABLET] TAKE 2 TABLETS BY MOUTH AT THE ONSET OF THE COLD SORE AND 1 TABLET 4 HOURS LATER. CAN REPEAT IF NEEDED FOR 3 DAYS       Antivirals for Herpes Protocol Passed - 8/19/2023  3:36 PM        Passed - Patient is age 12 or older        Passed - Recent (12 mo) or future (30 days) visit within the authorizing provider's specialty     Patient has had an office visit with the authorizing provider or a provider within the authorizing providers department within the previous 12 mos or has a future within next 30 days. See \"Patient Info\" tab in inbasket, or \"Choose Columns\" in Meds & Orders section of the refill encounter.              Passed - Medication is active on med list        Passed - Normal serum creatinine on file in past 12 months     Recent Labs   Lab Test 09/12/22  1351   CR 0.87       Ok to refill medication if creatinine is low                   Yancy Modi RN 08/21/23 12:11 PM    "

## 2023-09-11 ASSESSMENT — ENCOUNTER SYMPTOMS
SHORTNESS OF BREATH: 0
WEAKNESS: 0
HEMATURIA: 0
CONSTIPATION: 0
CHILLS: 0
SORE THROAT: 0
HEADACHES: 0
EYE PAIN: 0
PALPITATIONS: 0
BREAST MASS: 0
FREQUENCY: 0
DIZZINESS: 0
HEMATOCHEZIA: 0
DIARRHEA: 0
NAUSEA: 0
DYSURIA: 0
HEARTBURN: 0
JOINT SWELLING: 0
NERVOUS/ANXIOUS: 0
ABDOMINAL PAIN: 0
PARESTHESIAS: 0
ARTHRALGIAS: 0
FEVER: 0
MYALGIAS: 0
COUGH: 0

## 2023-09-11 ASSESSMENT — ACTIVITIES OF DAILY LIVING (ADL): CURRENT_FUNCTION: NO ASSISTANCE NEEDED

## 2023-09-18 ENCOUNTER — OFFICE VISIT (OUTPATIENT)
Dept: FAMILY MEDICINE | Facility: CLINIC | Age: 68
End: 2023-09-18
Payer: MEDICARE

## 2023-09-18 ENCOUNTER — HOSPITAL ENCOUNTER (OUTPATIENT)
Dept: ULTRASOUND IMAGING | Facility: HOSPITAL | Age: 68
Discharge: HOME | End: 2023-09-18
Attending: FAMILY MEDICINE | Admitting: FAMILY MEDICINE
Payer: MEDICARE

## 2023-09-18 VITALS
HEART RATE: 56 BPM | OXYGEN SATURATION: 99 % | DIASTOLIC BLOOD PRESSURE: 62 MMHG | RESPIRATION RATE: 12 BRPM | WEIGHT: 163 LBS | BODY MASS INDEX: 30.78 KG/M2 | TEMPERATURE: 97.7 F | SYSTOLIC BLOOD PRESSURE: 100 MMHG | HEIGHT: 61 IN

## 2023-09-18 DIAGNOSIS — B00.1 RECURRENT COLD SORES: ICD-10-CM

## 2023-09-18 DIAGNOSIS — Z78.0 POST-MENOPAUSAL: ICD-10-CM

## 2023-09-18 DIAGNOSIS — Z00.00 ENCOUNTER FOR MEDICARE ANNUAL WELLNESS EXAM: Primary | ICD-10-CM

## 2023-09-18 DIAGNOSIS — Z13.1 ENCOUNTER FOR SCREENING FOR DIABETES MELLITUS: ICD-10-CM

## 2023-09-18 DIAGNOSIS — E04.2 NON-TOXIC MULTINODULAR GOITER: ICD-10-CM

## 2023-09-18 DIAGNOSIS — Z13.220 SCREENING FOR HYPERLIPIDEMIA: ICD-10-CM

## 2023-09-18 LAB
ANION GAP SERPL CALCULATED.3IONS-SCNC: 5 MMOL/L (ref 7–15)
BUN SERPL-MCNC: 15.6 MG/DL (ref 8–23)
CALCIUM SERPL-MCNC: 9.8 MG/DL (ref 8.8–10.2)
CHLORIDE SERPL-SCNC: 106 MMOL/L (ref 98–107)
CHOLEST SERPL-MCNC: 212 MG/DL
CREAT SERPL-MCNC: 0.79 MG/DL (ref 0.51–0.95)
DEPRECATED HCO3 PLAS-SCNC: 29 MMOL/L (ref 22–29)
EGFRCR SERPLBLD CKD-EPI 2021: 81 ML/MIN/1.73M2
GLUCOSE SERPL-MCNC: 95 MG/DL (ref 70–99)
HDLC SERPL-MCNC: 60 MG/DL
LDLC SERPL CALC-MCNC: 131 MG/DL
NONHDLC SERPL-MCNC: 152 MG/DL
POTASSIUM SERPL-SCNC: 4 MMOL/L (ref 3.4–5.3)
SODIUM SERPL-SCNC: 140 MMOL/L (ref 136–145)
TRIGL SERPL-MCNC: 104 MG/DL
TSH SERPL DL<=0.005 MIU/L-ACNC: 0.6 UIU/ML (ref 0.3–4.2)

## 2023-09-18 PROCEDURE — 80048 BASIC METABOLIC PNL TOTAL CA: CPT | Performed by: FAMILY MEDICINE

## 2023-09-18 PROCEDURE — G0008 ADMIN INFLUENZA VIRUS VAC: HCPCS | Performed by: FAMILY MEDICINE

## 2023-09-18 PROCEDURE — 99213 OFFICE O/P EST LOW 20 MIN: CPT | Mod: 25 | Performed by: FAMILY MEDICINE

## 2023-09-18 PROCEDURE — 36415 COLL VENOUS BLD VENIPUNCTURE: CPT | Performed by: FAMILY MEDICINE

## 2023-09-18 PROCEDURE — 90662 IIV NO PRSV INCREASED AG IM: CPT | Performed by: FAMILY MEDICINE

## 2023-09-18 PROCEDURE — 80061 LIPID PANEL: CPT | Performed by: FAMILY MEDICINE

## 2023-09-18 PROCEDURE — 76536 US EXAM OF HEAD AND NECK: CPT

## 2023-09-18 PROCEDURE — G0439 PPPS, SUBSEQ VISIT: HCPCS | Performed by: FAMILY MEDICINE

## 2023-09-18 PROCEDURE — 84443 ASSAY THYROID STIM HORMONE: CPT | Performed by: FAMILY MEDICINE

## 2023-09-18 RX ORDER — VALACYCLOVIR HYDROCHLORIDE 1 G/1
TABLET, FILM COATED ORAL
Qty: 20 TABLET | Refills: 2 | Status: SHIPPED | OUTPATIENT
Start: 2023-09-18 | End: 2024-02-07

## 2023-09-18 ASSESSMENT — ENCOUNTER SYMPTOMS
MYALGIAS: 0
DIARRHEA: 0
DIZZINESS: 0
COUGH: 0
HEADACHES: 0
PALPITATIONS: 0
CHILLS: 0
HEMATOCHEZIA: 0
FEVER: 0
JOINT SWELLING: 0
SORE THROAT: 0
NERVOUS/ANXIOUS: 0
SHORTNESS OF BREATH: 0
ABDOMINAL PAIN: 0
EYE PAIN: 0
PARESTHESIAS: 0
CONSTIPATION: 0
ARTHRALGIAS: 0
NAUSEA: 0
FREQUENCY: 0
DYSURIA: 0
HEMATURIA: 0
BREAST MASS: 0
HEARTBURN: 0
WEAKNESS: 0

## 2023-09-18 ASSESSMENT — ACTIVITIES OF DAILY LIVING (ADL): CURRENT_FUNCTION: NO ASSISTANCE NEEDED

## 2023-09-18 NOTE — PROGRESS NOTES
"SUBJECTIVE:   Gaby is a 68 year old who presents for Preventive Visit.    Are you in the first 12 months of your Medicare coverage?  No    Healthy Habits:     In general, how would you rate your overall health?  Good    Frequency of exercise:  6-7 days/week    Duration of exercise:  45-60 minutes    Do you usually eat at least 4 servings of fruit and vegetables a day, include whole grains    & fiber and avoid regularly eating high fat or \"junk\" foods?  Yes    Taking medications regularly:  Yes    Ability to successfully perform activities of daily living:  No assistance needed    Home Safety:  No safety concerns identified    Hearing Impairment:  No hearing concerns    In the past 6 months, have you been bothered by leaking of urine?  No    In general, how would you rate your overall mental or emotional health?  Good    Additional concerns today:  No    Have you ever done Advance Care Planning? (For example, a Health Directive, POLST, or a discussion with a medical provider or your loved ones about your wishes): No, advance care planning information given to patient to review.  Patient plans to discuss their wishes with loved ones or provider.      Fall risk  Fallen 2 or more times in the past year?: No  Any fall with injury in the past year?: No    Cognitive Screening   1) Repeat 3 items (Leader, Season, Table)    2) Clock draw: NORMAL  3) 3 item recall: Recalls 3 objects  Results: 3 items recalled: COGNITIVE IMPAIRMENT LESS LIKELY    Mini-CogTM Copyright S Alberto. Licensed by the author for use in [facility]; reprinted with permission ([email or web address]). All rights reserved.      Do you have sleep apnea, excessive snoring or daytime drowsiness? : yes    Reviewed and updated as needed this visit by clinical staff    Allergies  Meds              Reviewed and updated as needed this visit by Provider                 Social History     Tobacco Use    Smoking status: Never    Smokeless tobacco: Never "   Substance Use Topics    Alcohol use: Yes     Comment: Alcoholic Drinks/day: occasionally             9/11/2023    12:43 PM   Alcohol Use   Prescreen: >3 drinks/day or >7 drinks/week? Not Applicable     Do you have a current opioid prescription? No  Do you use any other controlled substances or medications that are not prescribed by a provider? None      Past medical history significant for nontoxic multinodular goiter.  Ultrasound last year was stable.  Recommend ultrasound follow-up in 1 year.  She was following with endocrinology but has not seen them for quite some time.  TSH has been historically stable.    Current providers sharing in care for this patient include:   Patient Care Team:  Ema Hart MD as PCP - General  Ema Hart MD as Assigned PCP  Nikolay Johnson MD as MD (Endocrinology, Diabetes, and Metabolism)    The following health maintenance items are reviewed in Epic and correct as of today:  Health Maintenance   Topic Date Due    COVID-19 Vaccine (1) Never done    MEDICARE ANNUAL WELLNESS VISIT  09/18/2024    ANNUAL REVIEW OF HM ORDERS  09/18/2024    FALL RISK ASSESSMENT  09/18/2024    MAMMO SCREENING  05/01/2025    LIPID  09/12/2027    ADVANCE CARE PLANNING  09/18/2028    DTAP/TDAP/TD IMMUNIZATION (3 - Td or Tdap) 10/01/2029    COLORECTAL CANCER SCREENING  06/04/2031    DEXA  02/12/2036    PHQ-2 (once per calendar year)  Completed    INFLUENZA VACCINE  Completed    Pneumococcal Vaccine: 65+ Years  Completed    ZOSTER IMMUNIZATION  Completed    IPV IMMUNIZATION  Aged Out    HPV IMMUNIZATION  Aged Out    MENINGITIS IMMUNIZATION  Aged Out    HEPATITIS C SCREENING  Discontinued     Lab work is in process      FHS-7:       9/12/2022    11:47 AM 5/1/2023     2:50 PM 9/11/2023    12:48 PM   Breast CA Risk Assessment (FHS-7)   Did any of your first-degree relatives have breast or ovarian cancer? Yes Yes Yes   Did any of your relatives have bilateral breast cancer?  No No  "  Did any man in your family have breast cancer? No No No   Did any woman in your family have breast and ovarian cancer? No Yes No   Did any woman in your family have breast cancer before age 50 y? Yes Yes Yes   Do you have 2 or more relatives with breast and/or ovarian cancer? Yes Yes Yes   Do you have 2 or more relatives with breast and/or bowel cancer? Yes Yes Yes       Mammogram Screening: Recommended mammography every 1-2 years with patient discussion and risk factor consideration  Pertinent mammograms are reviewed under the imaging tab.    Review of Systems   Constitutional:  Negative for chills and fever.   HENT:  Negative for congestion, ear pain, hearing loss and sore throat.    Eyes:  Negative for pain and visual disturbance.   Respiratory:  Negative for cough and shortness of breath.    Cardiovascular:  Negative for chest pain, palpitations and peripheral edema.   Gastrointestinal:  Negative for abdominal pain, constipation, diarrhea, heartburn, hematochezia and nausea.   Breasts:  Negative for tenderness, breast mass and discharge.   Genitourinary:  Negative for dysuria, frequency, genital sores, hematuria, pelvic pain, urgency, vaginal bleeding and vaginal discharge.   Musculoskeletal:  Negative for arthralgias, joint swelling and myalgias.   Skin:  Negative for rash.   Neurological:  Negative for dizziness, weakness, headaches and paresthesias.   Psychiatric/Behavioral:  Negative for mood changes. The patient is not nervous/anxious.          OBJECTIVE:   /62   Pulse 56   Temp 97.7  F (36.5  C) (Tympanic)   Resp 12   Ht 1.543 m (5' 0.73\")   Wt 73.9 kg (163 lb)   SpO2 99%   BMI 31.08 kg/m   Estimated body mass index is 31.08 kg/m  as calculated from the following:    Height as of this encounter: 1.543 m (5' 0.73\").    Weight as of this encounter: 73.9 kg (163 lb).  Physical Exam    Physical Exam:  General Appearance: Alert, cooperative, no distress, appears stated age   Head: Normocephalic, " without obvious abnormality, atraumatic  Eyes: PERRL, conjunctiva/corneas clear, EOM's intact   Ears: Normal TM's and external ear canals, both ears  Nose:Nares normal, septum midline,mucosa normal, no drainage    Throat:Lips, mucosa, and tongue normal; teeth and gums normal, thyroid feels slightly enlarged bilaterally  Neck: Supple, symmetrical, trachea midline, no adenopathy;  thyroid: not enlarged, symmetric, no tenderness/mass/nodules  Back: Symmetric, no curvature, ROM normal,  Lungs: Clear to auscultation bilaterally, respirations unlabored  Breasts: No breast masses, tenderness, asymmetry, or nipple discharge.  Heart: Regular rate and rhythm, S1 and S2 normal, no murmur, rub, or gallop  Abdomen: Soft, non-tender, bowel sounds active all four quadrants,  no masses, no organomegaly  Extremities: Extremities normal, atraumatic, no cyanosis or edema  Skin: Skin color, texture, turgor normal, no rashes or lesions  Lymph nodes: Cervical, supraclavicular, and axillary nodes normal and   Neurologic: Normal          ASSESSMENT / PLAN:   1. Encounter for Medicare annual wellness exam  - REVIEW OF HEALTH MAINTENANCE PROTOCOL ORDERS  - PRIMARY CARE FOLLOW-UP SCHEDULING; Future    2. Recurrent cold sores  refill  - valACYclovir (VALTREX) 1000 mg tablet; [VALACYCLOVIR (VALTREX) 1000 MG TABLET] TAKE 2 TABLETS BY MOUTH AT THE ONSET OF THE COLD SORE AND 1 TABLET 4 HOURS LATER. CAN REPEAT IF NEEDED FOR 3 DAYS  Dispense: 20 tablet; Refill: 2    3. Non-toxic multinodular goiter  Ultrasound of thyroid ordered to reevaluate nodules.  Thyroid levels ordered as well.  - US Thyroid; Future  - TSH with free T4 reflex; Future  - TSH with free T4 reflex    4. Screening for hyperlipidemia  - Lipid panel reflex to direct LDL Fasting; Future  - Lipid panel reflex to direct LDL Fasting    5. Encounter for screening for diabetes mellitus  - BASIC METABOLIC PANEL; Future  - BASIC METABOLIC PANEL    6. Post-menopausal  - DX Hip/Pelvis/Spine;  Future      Patient has been advised of split billing requirements and indicates understanding: Yes      COUNSELING:  Reviewed preventive health counseling, as reflected in patient instructions        She reports that she has never smoked. She has never used smokeless tobacco.      Appropriate preventive services were discussed with this patient, including applicable screening as appropriate for cardiovascular disease, diabetes, osteopenia/osteoporosis, and glaucoma.  As appropriate for age/gender, discussed screening for colorectal cancer, prostate cancer, breast cancer, and cervical cancer. Checklist reviewing preventive services available has been given to the patient.    Reviewed patients plan of care and provided an AVS. The Basic Care Plan (routine screening as documented in Health Maintenance) for Yana meets the Care Plan requirement. This Care Plan has been established and reviewed with the Patient.          Ema Hart MD  [facility]    Identified Health Risks:  I have reviewed Opioid Use Disorder and Substance Use Disorder risk factors and made any needed referrals.

## 2023-09-18 NOTE — PATIENT INSTRUCTIONS
Patient Education   Personalized Prevention Plan  You are due for the preventive services outlined below.  Your care team is available to assist you in scheduling these services.  If you have already completed any of these items, please share that information with your care team to update in your medical record.  Health Maintenance Due   Topic Date Due     COVID-19 Vaccine (1) Never done     Hepatitis C Screening  Never done     Flu Vaccine (1) 09/01/2023     ANNUAL REVIEW OF HM ORDERS  09/12/2023

## 2023-10-13 ENCOUNTER — HOSPITAL ENCOUNTER (OUTPATIENT)
Dept: BONE DENSITY | Facility: HOSPITAL | Age: 68
Discharge: HOME | End: 2023-10-13
Attending: FAMILY MEDICINE | Admitting: FAMILY MEDICINE
Payer: MEDICARE

## 2023-10-13 DIAGNOSIS — Z78.0 POST-MENOPAUSAL: ICD-10-CM

## 2023-10-13 PROCEDURE — 77080 DXA BONE DENSITY AXIAL: CPT

## 2024-02-07 ENCOUNTER — MYC REFILL (OUTPATIENT)
Dept: FAMILY MEDICINE | Facility: CLINIC | Age: 69
End: 2024-02-07
Payer: MEDICARE

## 2024-02-07 DIAGNOSIS — B00.1 RECURRENT COLD SORES: ICD-10-CM

## 2024-02-08 RX ORDER — VALACYCLOVIR HYDROCHLORIDE 1 G/1
TABLET, FILM COATED ORAL
Qty: 20 TABLET | Refills: 2 | Status: SHIPPED | OUTPATIENT
Start: 2024-02-08 | End: 2024-09-30

## 2024-03-22 ENCOUNTER — OFFICE VISIT (OUTPATIENT)
Dept: FAMILY MEDICINE | Facility: CLINIC | Age: 69
End: 2024-03-22
Payer: MEDICARE

## 2024-03-22 VITALS
RESPIRATION RATE: 12 BRPM | HEART RATE: 62 BPM | WEIGHT: 167.13 LBS | DIASTOLIC BLOOD PRESSURE: 78 MMHG | BODY MASS INDEX: 31.55 KG/M2 | HEIGHT: 61 IN | TEMPERATURE: 98.7 F | SYSTOLIC BLOOD PRESSURE: 120 MMHG | OXYGEN SATURATION: 99 %

## 2024-03-22 DIAGNOSIS — G89.29 CHRONIC PAIN OF LEFT KNEE: Primary | ICD-10-CM

## 2024-03-22 DIAGNOSIS — M25.562 CHRONIC PAIN OF LEFT KNEE: Primary | ICD-10-CM

## 2024-03-22 PROCEDURE — 20610 DRAIN/INJ JOINT/BURSA W/O US: CPT | Mod: LT | Performed by: FAMILY MEDICINE

## 2024-03-22 RX ORDER — TRIAMCINOLONE ACETONIDE 40 MG/ML
40 INJECTION, SUSPENSION INTRA-ARTICULAR; INTRAMUSCULAR ONCE
Status: COMPLETED | OUTPATIENT
Start: 2024-03-22 | End: 2024-03-22

## 2024-03-22 RX ADMIN — TRIAMCINOLONE ACETONIDE 40 MG: 40 INJECTION, SUSPENSION INTRA-ARTICULAR; INTRAMUSCULAR at 14:31

## 2024-03-22 NOTE — PROGRESS NOTES
Knee injection procedure note:    Verbal consent was obtained for a left steroid knee injection today.  Risks of infection and bleeding discussed.  The patient was placed in a seated position and the lateral knee was cleansed with an alcohol swab.  The area was marked and then cleansed with 3 iodine swabs.  Using the lateral technique 1mL of 40mg/mL Triamcinolone and 4mL of 1% lidocaine were injected without incident.  Patient tolerated the procedure well.  EBL <1mL.    Answers submitted by the patient for this visit:  General Questionnaire (Submitted on 3/21/2024)  Chief Complaint: Chronic problems general questions HPI Form  What is the reason for your visit today? : knee pain  How many servings of fruits and vegetables do you eat daily?: 2-3  On average, how many sweetened beverages do you drink each day (Examples: soda, juice, sweet tea, etc.  Do NOT count diet or artificially sweetened beverages)?: 0  How many minutes a day do you exercise enough to make your heart beat faster?: 30 to 60  How many days a week do you exercise enough to make your heart beat faster?: 7  How many days per week do you miss taking your medication?: 0

## 2024-06-26 NOTE — PROGRESS NOTES
I have placed orders for both thyroid nodules interpreted as AUS to have Afirma testing performed. Recommendations to follow when the results are back.    duplicate

## 2024-07-26 PROBLEM — M25.562 LEFT KNEE PAIN: Status: ACTIVE | Noted: 2024-07-26

## 2024-09-30 ENCOUNTER — OFFICE VISIT (OUTPATIENT)
Dept: FAMILY MEDICINE | Facility: CLINIC | Age: 69
End: 2024-09-30
Attending: FAMILY MEDICINE
Payer: MEDICARE

## 2024-09-30 VITALS
BODY MASS INDEX: 27.4 KG/M2 | HEIGHT: 61 IN | RESPIRATION RATE: 12 BRPM | OXYGEN SATURATION: 99 % | WEIGHT: 145.13 LBS | DIASTOLIC BLOOD PRESSURE: 82 MMHG | SYSTOLIC BLOOD PRESSURE: 132 MMHG | TEMPERATURE: 97.5 F | HEART RATE: 60 BPM

## 2024-09-30 DIAGNOSIS — Z13.220 SCREENING FOR HYPERLIPIDEMIA: ICD-10-CM

## 2024-09-30 DIAGNOSIS — G89.29 CHRONIC PAIN OF LEFT KNEE: Chronic | ICD-10-CM

## 2024-09-30 DIAGNOSIS — E04.2 NON-TOXIC MULTINODULAR GOITER: ICD-10-CM

## 2024-09-30 DIAGNOSIS — B00.1 RECURRENT COLD SORES: ICD-10-CM

## 2024-09-30 DIAGNOSIS — Z00.00 ENCOUNTER FOR MEDICARE ANNUAL WELLNESS EXAM: Primary | ICD-10-CM

## 2024-09-30 DIAGNOSIS — M25.562 CHRONIC PAIN OF LEFT KNEE: Chronic | ICD-10-CM

## 2024-09-30 LAB
ANION GAP SERPL CALCULATED.3IONS-SCNC: 10 MMOL/L (ref 7–15)
BUN SERPL-MCNC: 14.2 MG/DL (ref 8–23)
CALCIUM SERPL-MCNC: 9.8 MG/DL (ref 8.8–10.4)
CHLORIDE SERPL-SCNC: 103 MMOL/L (ref 98–107)
CHOLEST SERPL-MCNC: 309 MG/DL
CREAT SERPL-MCNC: 0.79 MG/DL (ref 0.51–0.95)
EGFRCR SERPLBLD CKD-EPI 2021: 81 ML/MIN/1.73M2
FASTING STATUS PATIENT QL REPORTED: YES
FASTING STATUS PATIENT QL REPORTED: YES
GLUCOSE SERPL-MCNC: 92 MG/DL (ref 70–99)
HCO3 SERPL-SCNC: 26 MMOL/L (ref 22–29)
HDLC SERPL-MCNC: 77 MG/DL
LDLC SERPL CALC-MCNC: 215 MG/DL
NONHDLC SERPL-MCNC: 232 MG/DL
POTASSIUM SERPL-SCNC: 4.5 MMOL/L (ref 3.4–5.3)
SODIUM SERPL-SCNC: 139 MMOL/L (ref 135–145)
TRIGL SERPL-MCNC: 85 MG/DL
TSH SERPL DL<=0.005 MIU/L-ACNC: 0.57 UIU/ML (ref 0.3–4.2)

## 2024-09-30 PROCEDURE — G0439 PPPS, SUBSEQ VISIT: HCPCS | Performed by: FAMILY MEDICINE

## 2024-09-30 PROCEDURE — 36415 COLL VENOUS BLD VENIPUNCTURE: CPT | Performed by: FAMILY MEDICINE

## 2024-09-30 PROCEDURE — 80048 BASIC METABOLIC PNL TOTAL CA: CPT | Performed by: FAMILY MEDICINE

## 2024-09-30 PROCEDURE — 80061 LIPID PANEL: CPT | Performed by: FAMILY MEDICINE

## 2024-09-30 PROCEDURE — 84443 ASSAY THYROID STIM HORMONE: CPT | Performed by: FAMILY MEDICINE

## 2024-09-30 RX ORDER — VALACYCLOVIR HYDROCHLORIDE 1 G/1
TABLET, FILM COATED ORAL
Qty: 20 TABLET | Refills: 2 | Status: SHIPPED | OUTPATIENT
Start: 2024-09-30

## 2024-09-30 SDOH — HEALTH STABILITY: PHYSICAL HEALTH: ON AVERAGE, HOW MANY DAYS PER WEEK DO YOU ENGAGE IN MODERATE TO STRENUOUS EXERCISE (LIKE A BRISK WALK)?: 7 DAYS

## 2024-09-30 SDOH — HEALTH STABILITY: PHYSICAL HEALTH: ON AVERAGE, HOW MANY MINUTES DO YOU ENGAGE IN EXERCISE AT THIS LEVEL?: 50 MIN

## 2024-09-30 ASSESSMENT — SOCIAL DETERMINANTS OF HEALTH (SDOH)
HOW OFTEN DO YOU GET TOGETHER WITH FRIENDS OR RELATIVES?: MORE THAN THREE TIMES A WEEK
HOW OFTEN DO YOU GET TOGETHER WITH FRIENDS OR RELATIVES?: MORE THAN THREE TIMES A WEEK

## 2024-09-30 NOTE — PROGRESS NOTES
"Preventive Care Visit  [facility] NATHANIEL Hart MD, Family Medicine  Sep 30, 2024      Assessment & Plan     Encounter for Medicare annual wellness exam    - PRIMARY CARE FOLLOW-UP SCHEDULING  - Adult Dermatology  Referral; Future    Recurrent cold sores  refill  - valACYclovir (VALTREX) 1000 mg tablet; [VALACYCLOVIR (VALTREX) 1000 MG TABLET] TAKE 2 TABLETS BY MOUTH AT THE ONSET OF THE COLD SORE AND 1 TABLET 4 HOURS LATER. CAN REPEAT IF NEEDED FOR 3 DAYS    Screening for hyperlipidemia  - Lipid panel reflex to direct LDL Fasting; Future  - BASIC METABOLIC PANEL; Future    Non-toxic multinodular goiter    - TSH with free T4 reflex; Future    Chronic pain of left knee  Following with orthopedics.  Has had good success with injections.    Patient has been advised of split billing requirements and indicates understanding: Yes        BMI  Estimated body mass index is 27.65 kg/m  as calculated from the following:    Height as of this encounter: 1.543 m (5' 0.75\").    Weight as of this encounter: 65.8 kg (145 lb 2 oz).       Counseling  Appropriate preventive services were addressed with this patient via screening, questionnaire, or discussion as appropriate for fall prevention, nutrition, physical activity, Tobacco-use cessation, social engagement, weight loss and cognition.  Checklist reviewing preventive services available has been given to the patient.  Reviewed patient's diet, addressing concerns and/or questions.   The patient was instructed to see the dentist every 6 months.   She is at risk for psychosocial distress and has been provided with information to reduce risk.           Antionette Griffin is a 69 year old, presenting for the following:  Medicare Visit (AWV-Px/Fasting for labs today)          Health Care Directive  Patient does not have a Health Care Directive or Living Will: Discussed advance care planning with patient; however, patient declined at this " time.    HPI  Patient will be going on vacation with her sisters at [facility] and is excited about this.    Has grandchildren who live here and grandchildren who live in [place].            9/30/2024   General Health   How would you rate your overall physical health? Good   Feel stress (tense, anxious, or unable to sleep) Only a little      (!) STRESS CONCERN      9/30/2024   Nutrition   Diet: Regular (no restrictions)            9/30/2024   Exercise   Days per week of moderate/strenous exercise 7 days   Average minutes spent exercising at this level 50 min            9/30/2024   Social Factors   Frequency of gathering with friends or relatives More than three times a week   Worry food won't last until get money to buy more Patient declined   Food not last or not have enough money for food? Patient declined   Do you have housing? (Housing is defined as stable permanent housing and does not include staying ouside in a car, in a tent, in an abandoned building, in an overnight shelter, or couch-surfing.) Patient declined   Are you worried about losing your housing? Patient declined   Lack of transportation? Patient declined   Unable to get utilities (heat,electricity)? Patient declined            9/30/2024   Fall Risk   Fallen 2 or more times in the past year? No    No    No   Trouble with walking or balance? No    No    No       Multiple values from one day are sorted in reverse-chronological order          9/30/2024   Activities of Daily Living- Home Safety   Needs help with the following daily activites None of the above   Safety concerns in the home None of the above            9/30/2024   Dental   Dentist two times every year? (!) NO            9/30/2024   Hearing Screening   Hearing concerns? None of the above            9/30/2024   Driving Risk Screening   Patient/family members have concerns about driving (!) DECLINE            9/30/2024   General Alertness/Fatigue Screening   Have you been more tired  than usual lately? No            9/30/2024   Urinary Incontinence Screening   Bothered by leaking urine in past 6 months No               Today's PHQ-2 Score:       9/30/2024     9:43 AM   PHQ-2 ( 1999 [employer])   Q1: Little interest or pleasure in doing things 0   Q2: Feeling down, depressed or hopeless 0   PHQ-2 Score 0   Q1: Little interest or pleasure in doing things Not at all   Q2: Feeling down, depressed or hopeless Not at all   PHQ-2 Score 0           9/30/2024   Substance Use   Alcohol more than 3/day or more than 7/wk Not Applicable   Do you have a current opioid prescription? No   How severe/bad is pain from 1 to 10? 2/10   Do you use any other substances recreationally? No        Social History     Tobacco Use    Smoking status: Never    Smokeless tobacco: Never   Substance Use Topics    Alcohol use: Yes     Comment: Alcoholic Drinks/day: occasionally    Drug use: No           9/11/2023   LAST FHS-7 RESULTS   1st degree relative breast or ovarian cancer Yes   Any relative bilateral breast cancer No   Any male have breast cancer No   Any ONE woman have BOTH breast AND ovarian cancer No   Any woman with breast cancer before 50yrs Yes   2 or more relatives with breast AND/OR ovarian cancer Yes   2 or more relatives with breast AND/OR bowel cancer Yes           Mammogram Screening - Mammogram every 1-2 years updated in Health Maintenance based on mutual decision making      History of abnormal Pap smear: No - age 65 or older with adequate negative prior screening test results (3 consecutive negative cytology results, 2 consecutive negative cotesting results, or 2 consecutive negative HrHPV test results within 10 years, with the most recent test occurring within the recommended screening interval for the test used)        Latest Ref Rng & Units 10/1/2019     9:26 AM 11/6/2014     9:37 AM   PAP / HPV   PAP Negative for squamous intraepithelial lesion or malignancy. Negative for squamous intraepithelial lesion or  malignancy  Electronically signed by Froylan Bran CT (ASCP) on 10/8/2019 at 12:54 PM    Negative for squamous intraepithelial lesion or malignancy  Electronically signed by Katy Leo CT (ASCP) on 11/17/2014 at 12:20 PM      HPV 16 DNA NEG Negative     HPV 18 DNA NEG Negative     Other HR HPV NEG Negative       ASCVD Risk   The 10-year ASCVD risk score (Atul MEANS, et al., 2019) is: 9%    Values used to calculate the score:      Age: 69 years      Sex: Female      Is Non- : No      Diabetic: No      Tobacco smoker: No      Systolic Blood Pressure: 132 mmHg      Is BP treated: No      HDL Cholesterol: 60 mg/dL      Total Cholesterol: 212 mg/dL            Reviewed and updated as needed this visit by Provider                    Past Medical History:   Diagnosis Date    Knee osteoarthritis      Past Surgical History:   Procedure Laterality Date    CHOLECYSTECTOMY      HAND SURGERY      [facility] KNEE SCOPE, DIAGNOSTIC      Description: Arthroscopy Knee Left;  Proc Date: 10/12/1999;  Comments: ACL tear- not repaired; menisicus repaired    HC REMOVAL GALLBLADDER      Description: Cholecystectomy;  Recorded: 11/06/2014;    [facility] LAP,CHOLECYSTECTOMY/EXPLORE      Description: Cholecystectomy Laparoscopic;  Proc Date: 10/12/1992;    [facility] REPAIR CRUCIATE LIGAMENT,KNEE      Description: Primary Repair Of Knee Ligament Cruciate Anterior;  Recorded: 11/06/2014;     Current providers sharing in care for this patient include:  Patient Care Team:  Ema Hart MD as PCP - General  Ema Hart MD as Assigned PCP  Nikolay Johnson MD as MD (Endocrinology, Diabetes, and Metabolism)    The following health maintenance items are reviewed in Epic and correct as of today:  Health Maintenance   Topic Date Due    INFLUENZA VACCINE (1) 09/01/2024    COVID-19 Vaccine (1 - 2024-25 season) Never done    ANNUAL REVIEW OF HM ORDERS  09/18/2024    MAMMO SCREENING  05/01/2025     "MEDICARE ANNUAL WELLNESS VISIT  09/30/2025    FALL RISK ASSESSMENT  09/30/2025    GLUCOSE  09/18/2026    LIPID  09/18/2028    ADVANCE CARE PLANNING  09/18/2028    DTAP/TDAP/TD IMMUNIZATION (3 - Td or Tdap) 10/01/2029    RSV VACCINE (1 - 1-dose 75+ series) 09/05/2030    COLORECTAL CANCER SCREENING  06/04/2031    DEXA  10/13/2038    PHQ-2 (once per calendar year)  Completed    Pneumococcal Vaccine: 65+ Years  Completed    ZOSTER IMMUNIZATION  Completed    HPV IMMUNIZATION  Aged Out    MENINGITIS IMMUNIZATION  Aged Out    RSV MONOCLONAL ANTIBODY  Aged Out    HEPATITIS C SCREENING  Discontinued         Review of Systems  Constitutional, HEENT, cardiovascular, pulmonary, GI, , musculoskeletal, neuro, skin, endocrine and psych systems are negative, except as otherwise noted.     Objective    Exam  /82   Pulse 60   Temp 97.5  F (36.4  C) (Tympanic)   Resp 12   Ht 1.543 m (5' 0.75\")   Wt 65.8 kg (145 lb 2 oz)   SpO2 99%   BMI 27.65 kg/m     Estimated body mass index is 27.65 kg/m  as calculated from the following:    Height as of this encounter: 1.543 m (5' 0.75\").    Weight as of this encounter: 65.8 kg (145 lb 2 oz).    Physical Exam    Physical Exam:  General Appearance: Alert, cooperative, no distress, appears stated age   Head: Normocephalic, without obvious abnormality, atraumatic  Eyes: PERRL, conjunctiva/corneas clear, EOM's intact   Ears: Normal TM's and external ear canals, both ears  Nose:Nares normal, septum midline,mucosa normal, no drainage    Throat:Lips, mucosa, and tongue normal; teeth and gums normal  Neck: Supple, symmetrical, trachea midline, no adenopathy;  thyroid: not enlarged, symmetric, no tenderness/mass/nodules  Back: Symmetric, no curvature, ROM normal,  Lungs: Clear to auscultation bilaterally, respirations unlabored  Breasts: No breast masses, tenderness, asymmetry, or nipple discharge.  Heart: Regular rate and rhythm, S1 and S2 normal, no murmur, rub, or gallop  Abdomen: Soft, " non-tender, bowel sounds active all four quadrants,  no masses, no organomegaly  Extremities: Extremities normal, atraumatic, no cyanosis or edema  Skin: Skin color, texture, turgor normal, no rashes or lesions  Lymph nodes: Cervical, supraclavicular, and axillary nodes normal and   Neurologic: Normal          9/30/2024   Mini Cog   Clock Draw Score 2 Normal   3 Item Recall 3 objects recalled   Mini Cog Total Score 5                 Signed Electronically by: Ema Hart MD

## 2024-09-30 NOTE — PATIENT INSTRUCTIONS
Patient Education   Preventive Care Advice   This is general advice given by our system to help you stay healthy. However, your care team may have specific advice just for you. Please talk to your care team about your preventive care needs.  Nutrition  Eat 5 or more servings of fruits and vegetables each day.  Try wheat bread, brown rice and whole grain pasta (instead of white bread, rice, and pasta).  Get enough calcium and vitamin D. Check the label on foods and aim for 100% of the RDA (recommended daily allowance).  Lifestyle  Exercise at least 150 minutes each week  (30 minutes a day, 5 days a week).  Do muscle strengthening activities 2 days a week. These help control your weight and prevent disease.  No smoking.  Wear sunscreen to prevent skin cancer.  Have a dental exam and cleaning every 6 months.  Yearly exams  See your health care team every year to talk about:  Any changes in your health.  Any medicines your care team has prescribed.  Preventive care, family planning, and ways to prevent chronic diseases.  Shots (vaccines)   HPV shots (up to age 26), if you've never had them before.  Hepatitis B shots (up to age 59), if you've never had them before.  COVID-19 shot: Get this shot when it's due.  Flu shot: Get a flu shot every year.  Tetanus shot: Get a tetanus shot every 10 years.  Pneumococcal, hepatitis A, and RSV shots: Ask your care team if you need these based on your risk.  Shingles shot (for age 50 and up)  General health tests  Diabetes screening:  Starting at age 35, Get screened for diabetes at least every 3 years.  If you are younger than age 35, ask your care team if you should be screened for diabetes.  Cholesterol test: At age 39, start having a cholesterol test every 5 years, or more often if advised.  Bone density scan (DEXA): At age 50, ask your care team if you should have this scan for osteoporosis (brittle bones).  Hepatitis C: Get tested at least once in your life.  STIs (sexually  transmitted infections)  Before age 24: Ask your care team if you should be screened for STIs.  After age 24: Get screened for STIs if you're at risk. You are at risk for STIs (including HIV) if:  You are sexually active with more than one person.  You don't use condoms every time.  You or a partner was diagnosed with a sexually transmitted infection.  If you are at risk for HIV, ask about PrEP medicine to prevent HIV.  Get tested for HIV at least once in your life, whether you are at risk for HIV or not.  Cancer screening tests  Cervical cancer screening: If you have a cervix, begin getting regular cervical cancer screening tests starting at age 21.  Breast cancer scan (mammogram): If you've ever had breasts, begin having regular mammograms starting at age 40. This is a scan to check for breast cancer.  Colon cancer screening: It is important to start screening for colon cancer at age 45.  Have a colonoscopy test every 10 years (or more often if you're at risk) Or, ask your provider about stool tests like a FIT test every year or Cologuard test every 3 years.  To learn more about your testing options, visit:   .  For help making a decision, visit:   https://bit.ly/qi77212.  Prostate cancer screening test: If you have a prostate, ask your care team if a prostate cancer screening test (PSA) at age 55 is right for you.  Lung cancer screening: If you are a current or former smoker ages 50 to 80, ask your care team if ongoing lung cancer screenings are right for you.  For informational purposes only. Not to replace the advice of your health care provider. Copyright   2023 [employer]. All rights reserved. Clinically reviewed by the [facility] Transitions Program. "[employer] 071648 - REV 01/24.

## 2025-04-01 ENCOUNTER — PATIENT OUTREACH (OUTPATIENT)
Dept: CARE COORDINATION | Facility: CLINIC | Age: 70
End: 2025-04-01
Payer: MEDICARE

## 2025-04-08 ENCOUNTER — ANCILLARY PROCEDURE (OUTPATIENT)
Dept: MAMMOGRAPHY | Facility: CLINIC | Age: 70
End: 2025-04-08
Attending: FAMILY MEDICINE
Payer: MEDICARE

## 2025-04-08 DIAGNOSIS — Z12.31 VISIT FOR SCREENING MAMMOGRAM: ICD-10-CM

## 2025-04-08 PROCEDURE — 77063 BREAST TOMOSYNTHESIS BI: CPT

## 2025-04-08 PROCEDURE — 77067 SCR MAMMO BI INCL CAD: CPT

## (undated) RX ORDER — LIDOCAINE HYDROCHLORIDE 10 MG/ML
INJECTION, SOLUTION EPIDURAL; INFILTRATION; INTRACAUDAL; PERINEURAL
Status: DISPENSED
Start: 2021-10-28